# Patient Record
Sex: MALE | Race: WHITE | Employment: UNEMPLOYED | URBAN - METROPOLITAN AREA
[De-identification: names, ages, dates, MRNs, and addresses within clinical notes are randomized per-mention and may not be internally consistent; named-entity substitution may affect disease eponyms.]

---

## 2017-05-03 DIAGNOSIS — E78.2 MIXED HYPERLIPIDEMIA: ICD-10-CM

## 2017-05-03 LAB
A/G RATIO: 1.8 (ref 1.1–2.2)
ALBUMIN SERPL-MCNC: 4.3 G/DL (ref 3.4–5)
ALP BLD-CCNC: 58 U/L (ref 40–129)
ALT SERPL-CCNC: 28 U/L (ref 10–40)
ANION GAP SERPL CALCULATED.3IONS-SCNC: 12 MMOL/L (ref 3–16)
AST SERPL-CCNC: 24 U/L (ref 15–37)
BILIRUB SERPL-MCNC: 0.5 MG/DL (ref 0–1)
BUN BLDV-MCNC: 20 MG/DL (ref 7–20)
CALCIUM SERPL-MCNC: 9.3 MG/DL (ref 8.3–10.6)
CHLORIDE BLD-SCNC: 101 MMOL/L (ref 99–110)
CHOLESTEROL, TOTAL: 149 MG/DL (ref 0–199)
CO2: 28 MMOL/L (ref 21–32)
CREAT SERPL-MCNC: 0.9 MG/DL (ref 0.9–1.3)
GFR AFRICAN AMERICAN: >60
GFR NON-AFRICAN AMERICAN: >60
GLOBULIN: 2.4 G/DL
GLUCOSE BLD-MCNC: 125 MG/DL (ref 70–99)
HDLC SERPL-MCNC: 65 MG/DL (ref 40–60)
LDL CHOLESTEROL CALCULATED: 76 MG/DL
POTASSIUM SERPL-SCNC: 4.9 MMOL/L (ref 3.5–5.1)
SODIUM BLD-SCNC: 141 MMOL/L (ref 136–145)
TOTAL PROTEIN: 6.7 G/DL (ref 6.4–8.2)
TRIGL SERPL-MCNC: 40 MG/DL (ref 0–150)
VLDLC SERPL CALC-MCNC: 8 MG/DL

## 2017-05-04 LAB
ESTIMATED AVERAGE GLUCOSE: 171.4 MG/DL
HBA1C MFR BLD: 7.6 %

## 2017-05-08 ENCOUNTER — OFFICE VISIT (OUTPATIENT)
Dept: ENDOCRINOLOGY | Age: 52
End: 2017-05-08

## 2017-05-08 VITALS
WEIGHT: 195 LBS | HEART RATE: 70 BPM | HEIGHT: 71 IN | DIASTOLIC BLOOD PRESSURE: 68 MMHG | OXYGEN SATURATION: 97 % | BODY MASS INDEX: 27.3 KG/M2 | SYSTOLIC BLOOD PRESSURE: 112 MMHG

## 2017-05-08 DIAGNOSIS — R53.82 CHRONIC FATIGUE: ICD-10-CM

## 2017-05-08 DIAGNOSIS — B08.1 MOLLUSCA CONTAGIOSA: ICD-10-CM

## 2017-05-08 DIAGNOSIS — E78.2 MIXED HYPERLIPIDEMIA: ICD-10-CM

## 2017-05-08 DIAGNOSIS — Z46.81 INSULIN PUMP TITRATION: ICD-10-CM

## 2017-05-08 DIAGNOSIS — E55.9 VITAMIN D DEFICIENCY: ICD-10-CM

## 2017-05-08 LAB
CREATININE URINE: 122.3 MG/DL (ref 39–259)
MICROALBUMIN UR-MCNC: <1.2 MG/DL
MICROALBUMIN/CREAT UR-RTO: NORMAL MG/G (ref 0–30)

## 2017-05-08 PROCEDURE — 99215 OFFICE O/P EST HI 40 MIN: CPT | Performed by: INTERNAL MEDICINE

## 2017-05-08 ASSESSMENT — PATIENT HEALTH QUESTIONNAIRE - PHQ9
1. LITTLE INTEREST OR PLEASURE IN DOING THINGS: 0
2. FEELING DOWN, DEPRESSED OR HOPELESS: 0
SUM OF ALL RESPONSES TO PHQ QUESTIONS 1-9: 0
SUM OF ALL RESPONSES TO PHQ9 QUESTIONS 1 & 2: 0

## 2017-05-25 LAB
ANTI-THYROGLOB ABS: 18 IU/ML
BASOPHILS ABSOLUTE: 0 K/UL (ref 0–0.2)
BASOPHILS RELATIVE PERCENT: 0.5 %
EOSINOPHILS ABSOLUTE: 0.1 K/UL (ref 0–0.6)
EOSINOPHILS RELATIVE PERCENT: 2.6 %
FOLATE: 13.62 NG/ML (ref 4.78–24.2)
HCT VFR BLD CALC: 47 % (ref 40.5–52.5)
HEMOGLOBIN: 15.5 G/DL (ref 13.5–17.5)
HOMOCYSTEINE: 11 UMOL/L (ref 0–10)
LYMPHOCYTES ABSOLUTE: 1.8 K/UL (ref 1–5.1)
LYMPHOCYTES RELATIVE PERCENT: 31.4 %
MCH RBC QN AUTO: 29.3 PG (ref 26–34)
MCHC RBC AUTO-ENTMCNC: 33 G/DL (ref 31–36)
MCV RBC AUTO: 88.7 FL (ref 80–100)
MONOCYTES ABSOLUTE: 0.5 K/UL (ref 0–1.3)
MONOCYTES RELATIVE PERCENT: 8.5 %
NEUTROPHILS ABSOLUTE: 3.2 K/UL (ref 1.7–7.7)
NEUTROPHILS RELATIVE PERCENT: 57 %
PDW BLD-RTO: 14.1 % (ref 12.4–15.4)
PLATELET # BLD: 247 K/UL (ref 135–450)
PMV BLD AUTO: 8.1 FL (ref 5–10.5)
RBC # BLD: 5.3 M/UL (ref 4.2–5.9)
T4 FREE: 1.2 NG/DL (ref 0.9–1.8)
THYROID PEROXIDASE (TPO) ABS: 78 IU/ML
TSH SERPL DL<=0.05 MIU/L-ACNC: 1.58 UIU/ML (ref 0.27–4.2)
VITAMIN B-12: 457 PG/ML (ref 211–911)
WBC # BLD: 5.7 K/UL (ref 4–11)

## 2017-06-29 ENCOUNTER — TELEPHONE (OUTPATIENT)
Dept: ENDOCRINOLOGY | Age: 52
End: 2017-06-29

## 2019-04-28 ENCOUNTER — HOSPITAL ENCOUNTER (INPATIENT)
Age: 54
LOS: 1 days | Discharge: HOME | DRG: 919 | End: 2019-04-30
Attending: EMERGENCY MEDICINE | Admitting: FAMILY MEDICINE

## 2019-04-28 DIAGNOSIS — E10.10 DIABETIC KETOACIDOSIS WITHOUT COMA ASSOCIATED WITH TYPE 1 DIABETES MELLITUS (HCC): Primary | ICD-10-CM

## 2019-04-28 LAB
ANION GAP SERPL CALCULATED.3IONS-SCNC: 29 MMOL/L (ref 3–16)
BASE EXCESS VENOUS: -13 (ref -3–3)
BASE EXCESS VENOUS: -15 (ref -3–3)
BASOPHILS ABSOLUTE: 0.1 K/UL (ref 0–0.2)
BASOPHILS RELATIVE PERCENT: 0.3 %
BILIRUBIN URINE: NEGATIVE
BLOOD, URINE: NEGATIVE
BUN BLDV-MCNC: 39 MG/DL (ref 7–20)
CALCIUM IONIZED: 1.16 MMOL/L (ref 1.12–1.32)
CALCIUM SERPL-MCNC: 9.7 MG/DL (ref 8.3–10.6)
CHLORIDE BLD-SCNC: 90 MMOL/L (ref 99–110)
CLARITY: CLEAR
CO2: 12 MMOL/L (ref 21–32)
CO2: 14 MMOL/L (ref 21–32)
COLOR: YELLOW
CREAT SERPL-MCNC: 1.4 MG/DL (ref 0.9–1.3)
EOSINOPHILS ABSOLUTE: 0 K/UL (ref 0–0.6)
EOSINOPHILS RELATIVE PERCENT: 0 %
GFR AFRICAN AMERICAN: >60
GFR AFRICAN AMERICAN: >60
GFR NON-AFRICAN AMERICAN: 53
GFR NON-AFRICAN AMERICAN: 53
GLUCOSE BLD-MCNC: 394 MG/DL (ref 70–99)
GLUCOSE BLD-MCNC: 524 MG/DL (ref 70–99)
GLUCOSE BLD-MCNC: 569 MG/DL (ref 70–99)
GLUCOSE URINE: >=1000 MG/DL
HCO3 VENOUS: 12.5 MMOL/L (ref 23–29)
HCO3 VENOUS: 13.3 MMOL/L (ref 23–29)
HCT VFR BLD CALC: 46.1 % (ref 40.5–52.5)
HEMOGLOBIN: 15 G/DL (ref 13.5–17.5)
KETONES, URINE: >=80 MG/DL
LACTATE: 2.34 MMOL/L (ref 0.4–2)
LACTATE: 5.27 MMOL/L (ref 0.4–2)
LEUKOCYTE ESTERASE, URINE: NEGATIVE
LYMPHOCYTES ABSOLUTE: 1.1 K/UL (ref 1–5.1)
LYMPHOCYTES RELATIVE PERCENT: 4.8 %
MCH RBC QN AUTO: 29.2 PG (ref 26–34)
MCHC RBC AUTO-ENTMCNC: 32.5 G/DL (ref 31–36)
MCV RBC AUTO: 90 FL (ref 80–100)
MICROSCOPIC EXAMINATION: ABNORMAL
MONOCYTES ABSOLUTE: 1.9 K/UL (ref 0–1.3)
MONOCYTES RELATIVE PERCENT: 7.8 %
NEUTROPHILS ABSOLUTE: 21 K/UL (ref 1.7–7.7)
NEUTROPHILS RELATIVE PERCENT: 87.1 %
NITRITE, URINE: NEGATIVE
O2 SAT, VEN: 50 %
O2 SAT, VEN: 87 %
PCO2, VEN: 27.3 MM HG (ref 40–50)
PCO2, VEN: 31.5 MM HG (ref 40–50)
PDW BLD-RTO: 14 % (ref 12.4–15.4)
PERFORMED ON: ABNORMAL
PH UA: 5.5 (ref 5–8)
PH VENOUS: 7.21 (ref 7.35–7.45)
PH VENOUS: 7.29 (ref 7.35–7.45)
PLATELET # BLD: 278 K/UL (ref 135–450)
PMV BLD AUTO: 8.5 FL (ref 5–10.5)
PO2, VEN: 32 MM HG
PO2, VEN: 57 MM HG
POC ANION GAP: 17 (ref 10–20)
POC BUN: 38 MG/DL (ref 7–18)
POC CHLORIDE: 100 MMOL/L (ref 99–110)
POC CREATININE: 1.4 MG/DL (ref 0.9–1.3)
POC POTASSIUM: 5.4 MMOL/L (ref 3.5–5.1)
POC SAMPLE TYPE: ABNORMAL
POC SODIUM: 131 MMOL/L (ref 136–145)
POTASSIUM REFLEX MAGNESIUM: 5.6 MMOL/L (ref 3.5–5.1)
PROTEIN UA: NEGATIVE MG/DL
RBC # BLD: 5.12 M/UL (ref 4.2–5.9)
SODIUM BLD-SCNC: 131 MMOL/L (ref 136–145)
SPECIFIC GRAVITY UA: 1.02 (ref 1–1.03)
TCO2 CALC VENOUS: 13 MMOL/L
TCO2 CALC VENOUS: 14 MMOL/L
URINE REFLEX TO CULTURE: ABNORMAL
URINE TYPE: ABNORMAL
UROBILINOGEN, URINE: 0.2 E.U./DL
WBC # BLD: 24.1 K/UL (ref 4–11)

## 2019-04-28 PROCEDURE — 83605 ASSAY OF LACTIC ACID: CPT

## 2019-04-28 PROCEDURE — 96361 HYDRATE IV INFUSION ADD-ON: CPT

## 2019-04-28 PROCEDURE — 2580000003 HC RX 258: Performed by: EMERGENCY MEDICINE

## 2019-04-28 PROCEDURE — 96375 TX/PRO/DX INJ NEW DRUG ADDON: CPT

## 2019-04-28 PROCEDURE — 82803 BLOOD GASES ANY COMBINATION: CPT

## 2019-04-28 PROCEDURE — 6370000000 HC RX 637 (ALT 250 FOR IP): Performed by: EMERGENCY MEDICINE

## 2019-04-28 PROCEDURE — 85025 COMPLETE CBC W/AUTO DIFF WBC: CPT

## 2019-04-28 PROCEDURE — 81003 URINALYSIS AUTO W/O SCOPE: CPT

## 2019-04-28 PROCEDURE — 80047 BASIC METABLC PNL IONIZED CA: CPT

## 2019-04-28 PROCEDURE — 6360000002 HC RX W HCPCS: Performed by: EMERGENCY MEDICINE

## 2019-04-28 PROCEDURE — 99291 CRITICAL CARE FIRST HOUR: CPT

## 2019-04-28 PROCEDURE — 96374 THER/PROPH/DIAG INJ IV PUSH: CPT

## 2019-04-28 RX ORDER — 0.9 % SODIUM CHLORIDE 0.9 %
1000 INTRAVENOUS SOLUTION INTRAVENOUS ONCE
Status: COMPLETED | OUTPATIENT
Start: 2019-04-28 | End: 2019-04-28

## 2019-04-28 RX ORDER — SODIUM CHLORIDE 9 MG/ML
INJECTION, SOLUTION INTRAVENOUS
Status: DISCONTINUED
Start: 2019-04-28 | End: 2019-04-29

## 2019-04-28 RX ORDER — ONDANSETRON 2 MG/ML
4 INJECTION INTRAMUSCULAR; INTRAVENOUS ONCE
Status: COMPLETED | OUTPATIENT
Start: 2019-04-28 | End: 2019-04-28

## 2019-04-28 RX ORDER — NICOTINE POLACRILEX 4 MG
15 LOZENGE BUCCAL PRN
Status: DISCONTINUED | OUTPATIENT
Start: 2019-04-28 | End: 2019-04-29

## 2019-04-28 RX ORDER — SODIUM CHLORIDE, SODIUM LACTATE, POTASSIUM CHLORIDE, CALCIUM CHLORIDE 600; 310; 30; 20 MG/100ML; MG/100ML; MG/100ML; MG/100ML
1000 INJECTION, SOLUTION INTRAVENOUS ONCE
Status: COMPLETED | OUTPATIENT
Start: 2019-04-29 | End: 2019-04-29

## 2019-04-28 RX ORDER — DEXTROSE MONOHYDRATE 50 MG/ML
100 INJECTION, SOLUTION INTRAVENOUS PRN
Status: DISCONTINUED | OUTPATIENT
Start: 2019-04-28 | End: 2019-04-29

## 2019-04-28 RX ORDER — ROSUVASTATIN CALCIUM 20 MG/1
TABLET, COATED ORAL
Status: ON HOLD | COMMUNITY
End: 2019-04-30 | Stop reason: HOSPADM

## 2019-04-28 RX ORDER — DEXTROSE MONOHYDRATE 25 G/50ML
12.5 INJECTION, SOLUTION INTRAVENOUS PRN
Status: DISCONTINUED | OUTPATIENT
Start: 2019-04-28 | End: 2019-04-29

## 2019-04-28 RX ADMIN — SODIUM CHLORIDE 0.5 UNITS/HR: 9 INJECTION, SOLUTION INTRAVENOUS at 23:53

## 2019-04-28 RX ADMIN — SODIUM CHLORIDE, POTASSIUM CHLORIDE, SODIUM LACTATE AND CALCIUM CHLORIDE 1000 ML: 600; 310; 30; 20 INJECTION, SOLUTION INTRAVENOUS at 23:30

## 2019-04-28 RX ADMIN — INSULIN HUMAN 10 UNITS: 100 INJECTION, SOLUTION PARENTERAL at 23:17

## 2019-04-28 RX ADMIN — ONDANSETRON 4 MG: 2 INJECTION INTRAMUSCULAR; INTRAVENOUS at 21:41

## 2019-04-28 RX ADMIN — SODIUM CHLORIDE 1000 ML: 9 INJECTION, SOLUTION INTRAVENOUS at 21:41

## 2019-04-29 ENCOUNTER — APPOINTMENT (OUTPATIENT)
Dept: GENERAL RADIOLOGY | Age: 54
DRG: 919 | End: 2019-04-29

## 2019-04-29 PROBLEM — E10.10 DKA, TYPE 1, NOT AT GOAL (HCC): Status: ACTIVE | Noted: 2019-04-29

## 2019-04-29 LAB
ALBUMIN SERPL-MCNC: 3.4 G/DL (ref 3.4–5)
ALBUMIN SERPL-MCNC: 3.5 G/DL (ref 3.4–5)
ALP BLD-CCNC: 51 U/L (ref 40–129)
ALT SERPL-CCNC: 19 U/L (ref 10–40)
ANION GAP SERPL CALCULATED.3IONS-SCNC: 10 MMOL/L (ref 3–16)
ANION GAP SERPL CALCULATED.3IONS-SCNC: 14 MMOL/L (ref 3–16)
ANION GAP SERPL CALCULATED.3IONS-SCNC: 15 MMOL/L (ref 3–16)
ANION GAP SERPL CALCULATED.3IONS-SCNC: 15 MMOL/L (ref 3–16)
ANION GAP SERPL CALCULATED.3IONS-SCNC: 9 MMOL/L (ref 3–16)
ANION GAP SERPL CALCULATED.3IONS-SCNC: 9 MMOL/L (ref 3–16)
AST SERPL-CCNC: 16 U/L (ref 15–37)
BASOPHILS ABSOLUTE: 0 K/UL (ref 0–0.2)
BASOPHILS RELATIVE PERCENT: 0 %
BILIRUB SERPL-MCNC: 0.5 MG/DL (ref 0–1)
BILIRUBIN DIRECT: <0.2 MG/DL (ref 0–0.3)
BILIRUBIN, INDIRECT: ABNORMAL MG/DL (ref 0–1)
BUN BLDV-MCNC: 30 MG/DL (ref 7–20)
BUN BLDV-MCNC: 30 MG/DL (ref 7–20)
BUN BLDV-MCNC: 31 MG/DL (ref 7–20)
BUN BLDV-MCNC: 33 MG/DL (ref 7–20)
BUN BLDV-MCNC: 35 MG/DL (ref 7–20)
BUN BLDV-MCNC: 38 MG/DL (ref 7–20)
CALCIUM SERPL-MCNC: 8 MG/DL (ref 8.3–10.6)
CALCIUM SERPL-MCNC: 8.5 MG/DL (ref 8.3–10.6)
CALCIUM SERPL-MCNC: 8.6 MG/DL (ref 8.3–10.6)
CALCIUM SERPL-MCNC: 8.7 MG/DL (ref 8.3–10.6)
CHLORIDE BLD-SCNC: 101 MMOL/L (ref 99–110)
CHLORIDE BLD-SCNC: 102 MMOL/L (ref 99–110)
CHLORIDE BLD-SCNC: 103 MMOL/L (ref 99–110)
CHLORIDE BLD-SCNC: 103 MMOL/L (ref 99–110)
CHLORIDE BLD-SCNC: 106 MMOL/L (ref 99–110)
CHLORIDE BLD-SCNC: 108 MMOL/L (ref 99–110)
CHOLESTEROL, FASTING: 115 MG/DL (ref 0–199)
CO2: 16 MMOL/L (ref 21–32)
CO2: 16 MMOL/L (ref 21–32)
CO2: 18 MMOL/L (ref 21–32)
CO2: 19 MMOL/L (ref 21–32)
CO2: 20 MMOL/L (ref 21–32)
CO2: 20 MMOL/L (ref 21–32)
CREAT SERPL-MCNC: 1 MG/DL (ref 0.9–1.3)
CREAT SERPL-MCNC: 1.1 MG/DL (ref 0.9–1.3)
CREAT SERPL-MCNC: 1.1 MG/DL (ref 0.9–1.3)
CREAT SERPL-MCNC: 1.2 MG/DL (ref 0.9–1.3)
EOSINOPHILS ABSOLUTE: 0 K/UL (ref 0–0.6)
EOSINOPHILS RELATIVE PERCENT: 0 %
ESTIMATED AVERAGE GLUCOSE: 180 MG/DL
GFR AFRICAN AMERICAN: >60
GFR NON-AFRICAN AMERICAN: >60
GLUCOSE BLD-MCNC: 136 MG/DL (ref 70–99)
GLUCOSE BLD-MCNC: 143 MG/DL (ref 70–99)
GLUCOSE BLD-MCNC: 145 MG/DL (ref 70–99)
GLUCOSE BLD-MCNC: 148 MG/DL (ref 70–99)
GLUCOSE BLD-MCNC: 176 MG/DL (ref 70–99)
GLUCOSE BLD-MCNC: 204 MG/DL (ref 70–99)
GLUCOSE BLD-MCNC: 213 MG/DL (ref 70–99)
GLUCOSE BLD-MCNC: 226 MG/DL (ref 70–99)
GLUCOSE BLD-MCNC: 265 MG/DL (ref 70–99)
GLUCOSE BLD-MCNC: 273 MG/DL (ref 70–99)
GLUCOSE BLD-MCNC: 286 MG/DL (ref 70–99)
GLUCOSE BLD-MCNC: 301 MG/DL (ref 70–99)
GLUCOSE BLD-MCNC: 307 MG/DL (ref 70–99)
GLUCOSE BLD-MCNC: 322 MG/DL (ref 70–99)
GLUCOSE BLD-MCNC: 342 MG/DL (ref 70–99)
GLUCOSE BLD-MCNC: 346 MG/DL (ref 70–99)
GLUCOSE BLD-MCNC: 351 MG/DL (ref 70–99)
GLUCOSE BLD-MCNC: 362 MG/DL (ref 70–99)
GLUCOSE BLD-MCNC: 367 MG/DL (ref 70–99)
GLUCOSE BLD-MCNC: 381 MG/DL (ref 70–99)
GLUCOSE BLD-MCNC: 406 MG/DL (ref 70–99)
GLUCOSE BLD-MCNC: 426 MG/DL (ref 70–99)
GLUCOSE BLD-MCNC: 429 MG/DL (ref 70–99)
GLUCOSE BLD-MCNC: 450 MG/DL (ref 70–99)
HBA1C MFR BLD: 7.9 %
HCT VFR BLD CALC: 39 % (ref 40.5–52.5)
HDLC SERPL-MCNC: 52 MG/DL (ref 40–60)
HEMOGLOBIN: 13.1 G/DL (ref 13.5–17.5)
LDL CHOLESTEROL CALCULATED: 55 MG/DL
LIPASE: 6 U/L (ref 13–60)
LYMPHOCYTES ABSOLUTE: 2.4 K/UL (ref 1–5.1)
LYMPHOCYTES RELATIVE PERCENT: 12 %
MAGNESIUM: 2.2 MG/DL (ref 1.8–2.4)
MAGNESIUM: 2.3 MG/DL (ref 1.8–2.4)
MAGNESIUM: 2.3 MG/DL (ref 1.8–2.4)
MAGNESIUM: 2.4 MG/DL (ref 1.8–2.4)
MCH RBC QN AUTO: 29.6 PG (ref 26–34)
MCHC RBC AUTO-ENTMCNC: 33.6 G/DL (ref 31–36)
MCV RBC AUTO: 88 FL (ref 80–100)
MONOCYTES ABSOLUTE: 0.6 K/UL (ref 0–1.3)
MONOCYTES RELATIVE PERCENT: 3 %
NEUTROPHILS ABSOLUTE: 17.2 K/UL (ref 1.7–7.7)
NEUTROPHILS RELATIVE PERCENT: 85 %
PDW BLD-RTO: 13.9 % (ref 12.4–15.4)
PERFORMED ON: ABNORMAL
PHOSPHORUS: 1 MG/DL (ref 2.5–4.9)
PHOSPHORUS: 1.8 MG/DL (ref 2.5–4.9)
PHOSPHORUS: 1.9 MG/DL (ref 2.5–4.9)
PHOSPHORUS: 2.4 MG/DL (ref 2.5–4.9)
PHOSPHORUS: 2.7 MG/DL (ref 2.5–4.9)
PLATELET # BLD: 201 K/UL (ref 135–450)
PLATELET SLIDE REVIEW: ADEQUATE
PMV BLD AUTO: 7.9 FL (ref 5–10.5)
POTASSIUM SERPL-SCNC: 4.1 MMOL/L (ref 3.5–5.1)
POTASSIUM SERPL-SCNC: 4.5 MMOL/L (ref 3.5–5.1)
POTASSIUM SERPL-SCNC: 4.6 MMOL/L (ref 3.5–5.1)
POTASSIUM SERPL-SCNC: 4.6 MMOL/L (ref 3.5–5.1)
POTASSIUM SERPL-SCNC: 4.9 MMOL/L (ref 3.5–5.1)
POTASSIUM SERPL-SCNC: 5 MMOL/L (ref 3.5–5.1)
RBC # BLD: 4.43 M/UL (ref 4.2–5.9)
SLIDE REVIEW: ABNORMAL
SODIUM BLD-SCNC: 132 MMOL/L (ref 136–145)
SODIUM BLD-SCNC: 133 MMOL/L (ref 136–145)
SODIUM BLD-SCNC: 133 MMOL/L (ref 136–145)
SODIUM BLD-SCNC: 134 MMOL/L (ref 136–145)
SODIUM BLD-SCNC: 135 MMOL/L (ref 136–145)
SODIUM BLD-SCNC: 137 MMOL/L (ref 136–145)
T3 FREE: 1.7 PG/ML (ref 2.3–4.2)
TOTAL PROTEIN: 5.9 G/DL (ref 6.4–8.2)
TRIGLYCERIDE, FASTING: 42 MG/DL (ref 0–150)
TSH SERPL DL<=0.05 MIU/L-ACNC: 1.63 UIU/ML (ref 0.27–4.2)
VLDLC SERPL CALC-MCNC: 8 MG/DL
WBC # BLD: 20.2 K/UL (ref 4–11)

## 2019-04-29 PROCEDURE — 80076 HEPATIC FUNCTION PANEL: CPT

## 2019-04-29 PROCEDURE — 80069 RENAL FUNCTION PANEL: CPT

## 2019-04-29 PROCEDURE — 6370000000 HC RX 637 (ALT 250 FOR IP): Performed by: STUDENT IN AN ORGANIZED HEALTH CARE EDUCATION/TRAINING PROGRAM

## 2019-04-29 PROCEDURE — 36415 COLL VENOUS BLD VENIPUNCTURE: CPT

## 2019-04-29 PROCEDURE — 82043 UR ALBUMIN QUANTITATIVE: CPT

## 2019-04-29 PROCEDURE — 82570 ASSAY OF URINE CREATININE: CPT

## 2019-04-29 PROCEDURE — 85025 COMPLETE CBC W/AUTO DIFF WBC: CPT

## 2019-04-29 PROCEDURE — 2500000003 HC RX 250 WO HCPCS: Performed by: STUDENT IN AN ORGANIZED HEALTH CARE EDUCATION/TRAINING PROGRAM

## 2019-04-29 PROCEDURE — 84443 ASSAY THYROID STIM HORMONE: CPT

## 2019-04-29 PROCEDURE — 83735 ASSAY OF MAGNESIUM: CPT

## 2019-04-29 PROCEDURE — 80061 LIPID PANEL: CPT

## 2019-04-29 PROCEDURE — 6360000002 HC RX W HCPCS: Performed by: STUDENT IN AN ORGANIZED HEALTH CARE EDUCATION/TRAINING PROGRAM

## 2019-04-29 PROCEDURE — 83690 ASSAY OF LIPASE: CPT

## 2019-04-29 PROCEDURE — 2000000000 HC ICU R&B

## 2019-04-29 PROCEDURE — 2580000003 HC RX 258: Performed by: STUDENT IN AN ORGANIZED HEALTH CARE EDUCATION/TRAINING PROGRAM

## 2019-04-29 PROCEDURE — 99222 1ST HOSP IP/OBS MODERATE 55: CPT | Performed by: INTERNAL MEDICINE

## 2019-04-29 PROCEDURE — 83036 HEMOGLOBIN GLYCOSYLATED A1C: CPT

## 2019-04-29 PROCEDURE — 87040 BLOOD CULTURE FOR BACTERIA: CPT

## 2019-04-29 PROCEDURE — 71045 X-RAY EXAM CHEST 1 VIEW: CPT

## 2019-04-29 PROCEDURE — 94760 N-INVAS EAR/PLS OXIMETRY 1: CPT

## 2019-04-29 PROCEDURE — 84481 FREE ASSAY (FT-3): CPT

## 2019-04-29 PROCEDURE — 84100 ASSAY OF PHOSPHORUS: CPT

## 2019-04-29 RX ORDER — HEPARIN SODIUM 5000 [USP'U]/ML
5000 INJECTION, SOLUTION INTRAVENOUS; SUBCUTANEOUS EVERY 8 HOURS SCHEDULED
Status: DISCONTINUED | OUTPATIENT
Start: 2019-04-29 | End: 2019-04-30 | Stop reason: HOSPADM

## 2019-04-29 RX ORDER — DEXTROSE MONOHYDRATE 50 MG/ML
100 INJECTION, SOLUTION INTRAVENOUS PRN
Status: DISCONTINUED | OUTPATIENT
Start: 2019-04-29 | End: 2019-04-30 | Stop reason: HOSPADM

## 2019-04-29 RX ORDER — DEXTROSE, SODIUM CHLORIDE, AND POTASSIUM CHLORIDE 5; .45; .15 G/100ML; G/100ML; G/100ML
INJECTION INTRAVENOUS CONTINUOUS
Status: DISCONTINUED | OUTPATIENT
Start: 2019-04-29 | End: 2019-04-29

## 2019-04-29 RX ORDER — SODIUM CHLORIDE 450 MG/100ML
INJECTION, SOLUTION INTRAVENOUS
Status: DISPENSED
Start: 2019-04-29 | End: 2019-04-29

## 2019-04-29 RX ORDER — SODIUM CHLORIDE 450 MG/100ML
INJECTION, SOLUTION INTRAVENOUS CONTINUOUS
Status: DISCONTINUED | OUTPATIENT
Start: 2019-04-29 | End: 2019-04-29

## 2019-04-29 RX ORDER — DEXTROSE AND SODIUM CHLORIDE 5; .45 G/100ML; G/100ML
INJECTION, SOLUTION INTRAVENOUS CONTINUOUS PRN
Status: DISCONTINUED | OUTPATIENT
Start: 2019-04-29 | End: 2019-04-29

## 2019-04-29 RX ORDER — NICOTINE POLACRILEX 4 MG
15 LOZENGE BUCCAL PRN
Status: DISCONTINUED | OUTPATIENT
Start: 2019-04-29 | End: 2019-04-30 | Stop reason: HOSPADM

## 2019-04-29 RX ORDER — SODIUM CHLORIDE 9 MG/ML
INJECTION, SOLUTION INTRAVENOUS
Status: DISPENSED
Start: 2019-04-29 | End: 2019-04-29

## 2019-04-29 RX ORDER — DEXTROSE MONOHYDRATE 25 G/50ML
12.5 INJECTION, SOLUTION INTRAVENOUS PRN
Status: DISCONTINUED | OUTPATIENT
Start: 2019-04-29 | End: 2019-04-30 | Stop reason: HOSPADM

## 2019-04-29 RX ORDER — DEXTROSE, SODIUM CHLORIDE, AND POTASSIUM CHLORIDE 5; .45; .15 G/100ML; G/100ML; G/100ML
INJECTION INTRAVENOUS CONTINUOUS PRN
Status: DISCONTINUED | OUTPATIENT
Start: 2019-04-29 | End: 2019-04-29

## 2019-04-29 RX ORDER — POTASSIUM CHLORIDE 7.45 MG/ML
10 INJECTION INTRAVENOUS PRN
Status: DISCONTINUED | OUTPATIENT
Start: 2019-04-29 | End: 2019-04-30 | Stop reason: HOSPADM

## 2019-04-29 RX ORDER — ROSUVASTATIN CALCIUM 20 MG/1
20 TABLET, COATED ORAL NIGHTLY
Status: DISCONTINUED | OUTPATIENT
Start: 2019-04-30 | End: 2019-04-30 | Stop reason: HOSPADM

## 2019-04-29 RX ADMIN — DEXTROSE AND SODIUM CHLORIDE: 5; 450 INJECTION, SOLUTION INTRAVENOUS at 06:33

## 2019-04-29 RX ADMIN — SODIUM CHLORIDE 0.01 UNITS/KG/HR: 900 INJECTION, SOLUTION INTRAVENOUS at 01:49

## 2019-04-29 RX ADMIN — HEPARIN SODIUM 5000 UNITS: 5000 INJECTION, SOLUTION INTRAVENOUS; SUBCUTANEOUS at 22:09

## 2019-04-29 RX ADMIN — POTASSIUM PHOSPHATE, MONOBASIC AND POTASSIUM PHOSPHATE, DIBASIC 21 MMOL: 224; 236 INJECTION, SOLUTION, CONCENTRATE INTRAVENOUS at 20:49

## 2019-04-29 RX ADMIN — HEPARIN SODIUM 5000 UNITS: 5000 INJECTION, SOLUTION INTRAVENOUS; SUBCUTANEOUS at 06:04

## 2019-04-29 RX ADMIN — INSULIN HUMAN 10 UNITS: 100 INJECTION, SOLUTION PARENTERAL at 15:34

## 2019-04-29 RX ADMIN — INSULIN GLARGINE 30 UNITS: 100 INJECTION, SOLUTION SUBCUTANEOUS at 11:06

## 2019-04-29 RX ADMIN — SODIUM CHLORIDE: 4.5 INJECTION, SOLUTION INTRAVENOUS at 01:51

## 2019-04-29 RX ADMIN — INSULIN LISPRO 2 UNITS: 100 INJECTION, SOLUTION INTRAVENOUS; SUBCUTANEOUS at 12:14

## 2019-04-29 RX ADMIN — POTASSIUM CHLORIDE, DEXTROSE MONOHYDRATE AND SODIUM CHLORIDE: 150; 5; 450 INJECTION, SOLUTION INTRAVENOUS at 10:06

## 2019-04-29 RX ADMIN — INSULIN HUMAN 10 UNITS: 100 INJECTION, SOLUTION PARENTERAL at 20:46

## 2019-04-29 RX ADMIN — SODIUM CHLORIDE 20.1 UNITS/HR: 900 INJECTION, SOLUTION INTRAVENOUS at 08:37

## 2019-04-29 RX ADMIN — INSULIN GLARGINE 20 UNITS: 100 INJECTION, SOLUTION SUBCUTANEOUS at 23:25

## 2019-04-29 RX ADMIN — INSULIN HUMAN 15 UNITS: 100 INJECTION, SOLUTION PARENTERAL at 21:28

## 2019-04-29 RX ADMIN — SODIUM CHLORIDE 1.52 UNITS/HR: 900 INJECTION, SOLUTION INTRAVENOUS at 11:09

## 2019-04-29 RX ADMIN — INSULIN LISPRO 10 UNITS: 100 INJECTION, SOLUTION INTRAVENOUS; SUBCUTANEOUS at 12:14

## 2019-04-29 RX ADMIN — INSULIN LISPRO 6 UNITS: 100 INJECTION, SOLUTION INTRAVENOUS; SUBCUTANEOUS at 17:33

## 2019-04-29 RX ADMIN — HEPARIN SODIUM 5000 UNITS: 5000 INJECTION, SOLUTION INTRAVENOUS; SUBCUTANEOUS at 14:47

## 2019-04-29 RX ADMIN — POTASSIUM PHOSPHATE, MONOBASIC AND POTASSIUM PHOSPHATE, DIBASIC 20 MMOL: 224; 236 INJECTION, SOLUTION, CONCENTRATE INTRAVENOUS at 06:03

## 2019-04-29 RX ADMIN — SODIUM CHLORIDE 2.55 UNITS/HR: 900 INJECTION, SOLUTION INTRAVENOUS at 09:54

## 2019-04-29 RX ADMIN — INSULIN HUMAN 15 UNITS: 100 INJECTION, SOLUTION PARENTERAL at 23:24

## 2019-04-29 RX ADMIN — INSULIN LISPRO 10 UNITS: 100 INJECTION, SOLUTION INTRAVENOUS; SUBCUTANEOUS at 17:33

## 2019-04-29 ASSESSMENT — PAIN SCALES - GENERAL
PAINLEVEL_OUTOF10: 0
PAINLEVEL_OUTOF10: 0

## 2019-04-29 ASSESSMENT — ENCOUNTER SYMPTOMS
COLOR CHANGE: 0
ABDOMINAL PAIN: 1
NAUSEA: 1
COUGH: 0
CONSTIPATION: 0
ABDOMINAL PAIN: 0
DIARRHEA: 0
SHORTNESS OF BREATH: 0
VOMITING: 1

## 2019-04-29 NOTE — ED PROVIDER NOTES
Date ofevaluation: 4/28/2019    Chief Complaint   Hyperglycemia    Nursing Notes, Past Medical Hx, Past Surgical Hx, Social Hx, Allergies, and Family Hx were reviewed. History of Present Illness     Mac Hughes is a 47 y.o. male who presents with concerns of DKA. He is a type 1 diabetic managed on an insulin pump normally. He has been feeling fatigued, nauseated/vomiting, and had increased UOP and glucose readings over the past 2 days. He is concerned he is in DKA. When he changed his insulin pump needle today he noticed the plastic catheter was bent. He last changed it 3 days ago. He denies fever, abdominal pain, SOB, chest pain, diarrhea, rash, extremity pain/skin redness. He feels groggy but mental status is baseline per wife and patient. Review of Systems     Review of Systems   Constitutional: Positive for fatigue. Negative for activity change, appetite change and fever. HENT: Negative for congestion. Respiratory: Negative for cough and shortness of breath. Cardiovascular: Negative for chest pain. Gastrointestinal: Positive for nausea and vomiting. Negative for abdominal pain. Endocrine: Positive for polydipsia and polyuria. Genitourinary: Negative for decreased urine volume. Skin: Negative for color change and wound. Neurological: Negative for headaches. All other systems reviewed and are negative. Past Medical, Surgical, Family, and Social History         Diagnosis Date    Blood glucose labile     Hyperlipidemia     Insulin pump status     Wears V Go insulin delivery system. Removed at [phone] on12/6.  It is external      Thyroid nodule     NODULE    Type I (juvenile type) diabetes mellitus without mention of complication, uncontrolled          Procedure Laterality Date    MOUTH SURGERY      OTHER SURGICAL HISTORY Right [id number]    EXCISION OF RIGHT SHOULDER MASS        His family history includes Diabetes in his maternal grandmother and mother; Heart Disease in his father; High Blood Pressure in his mother. He reports that he has never smoked. He has never used smokeless tobacco. He reports that he does not drink alcohol or use drugs. Medications     Previous Medications    GLUCAGON 1 MG INJECTION    Inject 1 mg into the skin as needed. GLUCOSE BLOOD VI TEST STRIPS (SHAYY CONTOUR NEXT TEST) STRIP    TEST BLOOD SUGAR SIX TIMES DAILY    HUMALOG 100 UNIT/ML INJECTION VIAL    INJECT UP  UNITS UNDER THE SKIN DAILY VIA INSULIN PUMP    INSULIN LISPRO (HUMALOG) 100 UNIT/ML INJECTION VIAL    Humalog U-100 Insulin 100 unit/mL subcutaneous solution    ROSUVASTATIN (CRESTOR) 20 MG TABLET    Take 1 tablet by mouth nightly    ROSUVASTATIN (CRESTOR) 20 MG TABLET    rosuvastatin 20 mg tablet       Allergies     He has No Known Allergies. Physical Exam     INITIAL VITALS: BP (!) 138/56   Pulse 111   Resp 17   SpO2 99%    Physical Exam   Constitutional: He is oriented to person, place, and time. He appears well-developed and well-nourished. No distress. Smells of ketones   HENT:   Head: Normocephalic and atraumatic. Mouth/Throat: No oropharyngeal exudate. Dry MM    Eyes: Pupils are equal, round, and reactive to light. EOM are normal.   Neck: Normal range of motion. Neck supple. Cardiovascular: Regular rhythm and normal heart sounds. Mild tachycardia   Pulmonary/Chest: Effort normal and breath sounds normal. No stridor. No respiratory distress. He has no wheezes. He has no rales. He exhibits no tenderness. Abdominal: Soft. He exhibits no distension. There is no tenderness. There is no guarding. Musculoskeletal: Normal range of motion. He exhibits no edema or deformity. Neurological: He is alert and oriented to person, place, and time. No cranial nerve deficit. Coordination normal.   Skin: Skin is warm and dry. Capillary refill takes less than 2 seconds. No rash noted. Psychiatric: He has a normal mood and affect.    Nursing note and vitals reviewed. Diagnostic Results       RADIOLOGY:  No orders to display       LABS:   Labs Reviewed   CBC WITH AUTO DIFFERENTIAL - Abnormal; Notable for the following components:       Result Value    WBC 24.1 (*)     Neutrophils # 21.0 (*)     Monocytes # 1.9 (*)     All other components within normal limits    Narrative:     Performed at: [facility] - [facility]  [address] [address] [address]   Phone [phone]   BASIC METABOLIC PANEL W/ REFLEX TO MG FOR LOW K - Abnormal; Notable for the following components:    Sodium 131 (*)     Potassium reflex Magnesium 5.6 (*)     Chloride 90 (*)     CO2 12 (*)     Anion Gap 29 (*)     Glucose 569 (*)     BUN 39 (*)     CREATININE 1.4 (*)     GFR Non- 53 (*)     All other components within normal limits    Narrative:     Danny Brett tel. [id number],  Chemistry results called to and read back by [facility], 04/28/2019 22:10,  by ENEDINA  Performed at: [facility] [employer] [facility] [place] [address] [address]   Phone [phone]   POCT VENOUS - Abnormal; Notable for the following components:    POC Sodium 131 (*)     POC Potassium 5.4 (*)     CO2 14 (*)     POC Glucose 524 (*)     POC BUN 38 (*)     POC Creatinine 1.4 (*)     GFR Non- 53 (*)     All other components within normal limits    Narrative:     Performed at: [facility] - [facility]  [address] [address] [address]   Phone [phone]   POCT VENOUS - Abnormal; Notable for the following components:    pH, Jeet 7.207 (*)     pCO2, Jeet 31.5 (*)     HCO3, Venous 12.5 (*)     Base Excess, Jeet -15 (*)     Lactate 5.27 (*)     All other components within normal limits    Narrative:     Performed at:   [facility] - [facility]  [address] [address]   Phone [phone]   URINE RT REFLEX TO CULTURE   POCT BLOOD GASES   POCT CHEM BASIC W ICA   POCT LACTIC ACID (LACTATE)   POCT GLUCOSE       RECENTVITALS:  BP: (!) 138/56,  , Pulse: 111, Resp: 17     Procedures       ED Course     The patient was given the following medications:  Orders Placed This Encounter   Medications    0.9 % sodium chloride bolus    ondansetron (ZOFRAN) injection 4 mg    sodium chloride 0.9 % infusion     REJI LOWE: cabinet override    insulin regular (HUMULIN R;NOVOLIN R) 100 Units in sodium chloride 0.9 % 100 mL infusion    glucose (GLUTOSE) 40 % oral gel 15 g    dextrose 50 % solution 12.5 g    glucagon (rDNA) injection 1 mg    dextrose 5 % solution    insulin regular (HUMULIN R;NOVOLIN R) injection 10 Units            CONSULTS:  IP CONSULT TO HOSPITALIST    MEDICAL DECISION MAKING     Tariq Lobo is a 47 y.o. male presenting with concerns for DKA. Upon presentation, he was slightly tachycardic but otherwise had reassuring vital signs. Laboratory assessment was notable for ketosis and metabolic acidosis with a venous pH of 7.20, bicarb of 12 and anion gap of 29. Glucose is 569 and he has ketones in his urine. I have some concerns that his insulin pump may be malfunctioning given that it did not alarm with kinked tubing. He was given an insulin bolus and insulin drip was initiated once potassium of 5.6 was confirmed. Although he has a leukocytosis of 24, he does not have any localizing infection on history or physical exam.  I feel he requires admission. He has been admitted to the intensive care unit. I have discussed his care with the hospitalist and the ICU team.    This patient was also evaluated by the attending physician. All care plans were discussed and agreedupon. Clinical Impression     1. Diabetic ketoacidosis without coma associated with type 1 diabetes mellitus ([address]        Disposition/Plan     PATIENT REFERRED TO:  No follow-up provider specified.     DISCHARGE MEDICATIONS:  New Prescriptions    No medications on file       DISPOSITION Decision To Admit 04/28/2019 11:05:29 PM        Sunil Prabhakar MD  Resident  04/29/19 2732

## 2019-04-29 NOTE — PROGRESS NOTES
[facility] [employer]  Diabetes Education   Progress Note       [facility] RECORD NUMBER:  [id number]  AGE: 47 y.o. GENDER: male  : 1965  TODAY'S DATE:  2019    Subjective   Reason for Diabetic Education Evaluation and Assessment: General diabetes education    Visit Type: evaluation      Irish Peñaloza is a 47 y.o. male referred by:     [x] Physician  [] Nursing  [] Chart Review   [] Other:     PAST MEDICAL HISTORY        Diagnosis Date    Blood glucose labile     Hyperlipidemia     Insulin pump status     Wears V Go insulin delivery system. Removed at [phone] on.  It is external      Thyroid nodule     NODULE    Type I (juvenile type) diabetes mellitus without mention of complication, uncontrolled        PAST SURGICAL HISTORY    Past Surgical History:   Procedure Laterality Date    MOUTH SURGERY      OTHER SURGICAL HISTORY Right [id number]    EXCISION OF RIGHT SHOULDER MASS          FAMILY HISTORY    Family History   Problem Relation Age of Onset    High Blood Pressure Mother     Diabetes Mother     Heart Disease Father     Diabetes Maternal Grandmother        SOCIAL HISTORY    Social History     Tobacco Use    Smoking status: Never Smoker    Smokeless tobacco: Never Used   Substance Use Topics    Alcohol use: No    Drug use: No       ALLERGIES    No Known Allergies    MEDICATIONS     [START ON 2019] rosuvastatin  20 mg Oral Nightly    heparin (porcine)  5,000 Units Subcutaneous 3 times per day    insulin lispro  10 Units Subcutaneous TID WC    insulin lispro  0-12 Units Subcutaneous TID WC    insulin lispro  0-6 Units Subcutaneous Nightly       Objective        Patient Active Problem List   Diagnosis Code    Thyroid nodule E04.1    Mass of shoulder region R22.30    SIRS (systemic inflammatory response syndrome) ([facility]) R65.10    FUO (fever of unknown origin) R50.9    CAP (community acquired pneumonia) J18.9    Fever R50.9    DKA (diabetic ketoacidoses) ([facility]) E13.10    Mixed hyperlipidemia E78.2    Uncontrolled type 1 diabetes mellitus without complication (HCC) V05.20    Insulin pump titration Z46.81    DKA, type 1, not at goal ([facility] E10.10        /68   Pulse 91   Temp 98.2 °F (36.8 °C) (Oral)   Resp 20   Ht 5' 11\" (1.803 m)   Wt 195 lb 8.8 oz (88.7 kg)   SpO2 97%   BMI 27.27 kg/m²     HgBA1c:    Lab Results   Component Value Date    LABA1C 7.9 04/29/2019       Recent Labs     04/29/19  0948 04/29/19  1102 04/29/19  1157 04/29/19  1313   POCGLU 145* 136* 148* 265*       BUN/Creatinine:    Lab Results   Component Value Date    BUN 31 04/29/2019    CREATININE 1.0 04/29/2019       Assessment        Diabetes Management and Education    Does the patient have a Primary Care Physician? Yes,      Does the patient require new medication instruction? To be determined    Person responsible for administration of Insulin/Medication:        [x] Self     [] Caregiver       [] Spouse       [] Other Family Member   []  Other    Insulin Instruction:  insulin pump  Injection Site:   [x] location    [x] rotation     Level of patient/caregiver understanding able to:     [] Verbalized Understanding   [] Demonstrated Understanding       [] Teach Back       [x] Needs Reinforcement     []  Other:        Does the patient/caregiver monitor Blood Glucoses? Yes    Does the patient/caregiver follow a Meal Plan? Reviewed importance of eating three meals per day for consistent carb intake. Level of patient/caregiver understanding able to:     [] Verbalized Understanding   [] Demonstrated Understanding       [] Teach Back       [x] Needs Reinforcement     []  Other:      Does the patient/caregiver understand S/S of Hypoglycemia? Reviewed symptoms, prevention and treatment.      Level of patient/caregiver understanding able to:    \ [] Verbalized Understanding   [] Demonstrated Understanding       [] Teach Back       [x] Needs Reinforcement     []  Other:

## 2019-04-29 NOTE — PROGRESS NOTES
mL/hr at 19 1374    sodium chloride      sodium chloride       PRN Meds:dextrose, potassium chloride, dextrose 5 % and 0.45 % NaCl    Objective:   Vitals  T-max: Temp (24hrs), Av.7 °F (37.1 °C), Min:98.6 °F (37 °C), Max:98.7 °F (37.1 °C)    Patient Vitals for the past 8 hrs:   BP Temp Temp src Pulse Resp SpO2 Height Weight   19 0600 113/65 -- -- 98 19 97 % -- --   19 0500 (!) 112/59 -- -- 98 18 97 % -- --   19 0400 (!) 111/49 98.7 °F (37.1 °C) Oral 95 17 96 % -- --   19 0300 (!) 108/52 -- -- 97 19 98 % -- --   19 0251 (!) 108/50 -- -- 96 20 99 % -- --   19 0100 104/61 98.6 °F (37 °C) Oral 100 18 100 % 5' 11\" (1.803 m) 195 lb 8.8 oz (88.7 kg)   19 0030 114/69 -- -- -- -- 100 % -- --       Intake/Output Summary (Last 24 hours) at 2019 0751  Last data filed at 2019 0747  Gross per 24 hour   Intake 1340 ml   Output 650 ml   Net 690 ml       Physical Exam   Constitutional: He is oriented to person, place, and time. He appears well-developed and well-nourished. No distress. HENT:   Head: Normocephalic and atraumatic. Eyes: Pupils are equal, round, and reactive to light. EOM are normal.   Neck: Normal range of motion. No JVD present. Cardiovascular: Normal rate, regular rhythm, normal heart sounds and intact distal pulses. Exam reveals no gallop and no friction rub. No murmur heard. Pulmonary/Chest: Effort normal and breath sounds normal. No stridor. No respiratory distress. He has no wheezes. He has no rales. He exhibits no tenderness. Abdominal: Soft. Bowel sounds are normal. He exhibits no distension and no mass. There is no tenderness. There is no rebound and no guarding. Musculoskeletal: Normal range of motion. He exhibits no edema or deformity. Lymphadenopathy:     He has no cervical adenopathy. Neurological: He is alert and oriented to person, place, and time. He displays normal reflexes. No cranial nerve deficit or sensory deficit.  He exhibits normal muscle tone. Coordination normal.   Skin: Skin is warm and dry. Capillary refill takes less than 2 seconds. He is not diaphoretic. LABS    CBC:   Recent Labs     04/28/19 2143 04/29/19  0356   WBC 24.1* 20.2*   HGB 15.0 13.1*   HCT 46.1 39.0*    201   MCV 90.0 88.0     Renal:   Recent Labs     04/28/19 2122 04/28/19 2143 04/29/19  0356   NA  --  131* 133*   K  --  5.6* 4.6   CL  --  90* 102   CO2 14* 12* 16*   BUN  --  39* 38*   CREATININE 1.4* 1.4* 1.1   GLUCOSE  --  569* 362*   CALCIUM  --  9.7 8.6   MG  --   --  2.30   PHOS  --   --  1.9*   ANIONGAP  --  29* 15     Hepatic: No results for input(s): AST, ALT, BILITOT, BILIDIR, PROT, LABALBU, ALKPHOS in the last 72 hours. Troponin: No results for input(s): TROPONINI in the last 72 hours. BNP: No results for input(s): BNP in the last 72 hours. Lipids: No results for input(s): CHOL, HDL in the last 72 hours. Invalid input(s): LDLCALCU, TRIGLYCERIDE  ABGs:  No results for input(s): PHART, ARG6LKU, PO2ART, VUW0NHL, BEART, THGBART, L0XMFBFX, PRC9ECW in the last 72 hours. INR: No results for input(s): INR in the last 72 hours. Lactate:   Recent Labs     04/28/19 2123 04/28/19  2352   LACTATE 5.27* 2.34*     -----------------------------------------------------------------  Imaging:  XR CHEST PORTABLE   Final Result      No active cardiopulmonary disease. Assessment/Plan:   Josue Campos is a 47 y.o. male with a Hx of DMI (s/p insulin pump 2015, 90-95 U daily) p/w one day's duration of fatigue, abd pain, nausea and vomiting and admitted for diabetic ketoacidosis. DKA - Possible insulin pump malfunction - kinked line when changed. No recent illnesses or endorsing symptoms. Found to be acidotic to 7.207/31.5/32/12. 5. HCO: 12. UA showed ketones.  Corrected Na on admission: 139.   - Endocrinology consulted for home insulin pump management   - insulin gtt   - BMP Q4H   - glucose q1h -- Glucose 362 -> 226 - > 204  - 1/2 NS at 250 ml/hr -- switched to D5 1/2 NS this AM   - NPO  - consulted diabetes educator, social work     Anion Gap Metabolic Acidosis 2/2 DKA -- HCO3: 16 (from 12), AG: 15 (from 29) this AM.   - improving with insulin gtt    - BMP Q4H     JUSTIN - Likely 2/2 to dehydration and compounding DKA, Cr: 1.4 on admission, Cr: 1.1 today, baseline 0.8   - BMP Q4H     HLD  - cont Crestor tomorrow night     Code Status:  Full   FEN: NPO   PPX: Heparin 5000U SQ   DISPO: ICU, insulin gtt,       -----------------------------  JEANNETTE Dan acting as scribe   4/29/2019  7:51 AM

## 2019-04-29 NOTE — ED PROVIDER NOTES
ED Attending Attestation Note     Date of evaluation: 4/28/2019    This patient was seen by the resident. I have seen and examined the patient, agree with the workup, evaluation, management and diagnosis. The care plan has been discussed. My assessment reveals an uncomfortable but nontoxic-appearing 81-year-old male who is an insulin-dependent diabetic, on insulin pump, presents with DKA. He states he's not had DKA since he's been on insulin pump. He does report decreased by mouth intake and strenuous activity yesterday, which he believes is the inciting factor. Insulin pump is otherwise working however he did note that when he replaced the pump earlier today, the needle was kinked but the pump did not indicate a malfunction. Patient received IV fluids. Acidotic with pH 7.2. Critical Care:  Due to the immediate potential for life-threatening deterioration due to DKA, I spent 40 minutes providing critical care. This time excludes time spent performing procedures but includes time spent on direct patient care, history retrieval, review of the chart, and discussions with patient, family, and consultant(s).       Yvette Olmedo MD  04/28/19 7968       Yvette Olmedo MD  04/28/19 4870

## 2019-04-29 NOTE — PROGRESS NOTES
Musculoskeletal: Normal range of motion. He exhibits no edema or tenderness. Neurological: He is alert and oriented to person, place, and time. Grossly non-focal   Skin: Skin is warm and dry. He is not diaphoretic. Psychiatric: He has a normal mood and affect. His behavior is normal. Judgment and thought content normal.   Nursing note and vitals reviewed. Labs:  Reviewed  Recent Labs     04/28/19 2143 04/29/19  0356   WBC 24.1* 20.2*   HGB 15.0 13.1*   HCT 46.1 39.0*    201     Recent Labs     04/28/19 2143 04/29/19  0356 04/29/19  0803   * 133* 135*   K 5.6* 4.6 4.1   CL 90* 102 106   CO2 12* 16* 19*   BUN 39* 38* 35*   CREATININE 1.4* 1.1 1.1   CALCIUM 9.7 8.6 8.6   PHOS  --  1.9* 2.4*     No results for input(s): AST, ALT, BILIDIR, BILITOT, ALKPHOS in the last 72 hours. No results for input(s): INR in the last 72 hours. No results for input(s): Eward Pong in the last 72 hours. Urinalysis:   Lab Results   Component Value Date    NITRU Negative 04/28/2019    WBCUA None seen 07/09/2014    RBCUA 0-2 07/09/2014    BLOODU Negative 04/28/2019    SPECGRAV 1.020 04/28/2019    GLUCOSEU >=1000 04/28/2019       Radiology:  Reviewed  XR CHEST PORTABLE   Final Result      No active cardiopulmonary disease. Assessment & Plan  Patient Active Hospital Problem List:   DKA, type 1, not at goal [facility]) (4/29/2019)    Assessment: likely 2/2 dehydration &/or JUSTIN &/or gastritis. He changed site on pump Sunday evening per usual schedule. He noted a kink in the needle; however, pump never alarmed as it would for kink in tubing. Plan: Insulin drip transition to subq. Hold home insulin pump (2015, 90-95u daily) due to ?malfunction. Resolved Problems: JUSTIN    DVT Prophylaxis: SQH  Diet: DIET CARB CONTROL;  Code Status: Full Code  PT/OT Eval Status: not needed  Dispo - ICU, floor vs home when stable.     Will discuss with MD Belem Robles MD

## 2019-04-29 NOTE — PROGRESS NOTES
Pt admitted to ICU bed 4525 with wife at bedside. Pt calm and pleasant. Able to walk from stretcher to bed. Insulin gtt and IVF infusing. Pt given CHG bath and sacral heart applied. No skin breakdown noticed. Insulin pump disconnected. 4 Eyes Skin Assessment     The patient is being assess for  Admission    I agree that 2 RN's have performed a thorough Head to Toe Skin Assessment on the patient. ALL assessment sites listed below have been assessed. Areas assessed by both nurses: Courtney Esteves and Walterlin  [x]   Head, Face, and Ears   [x]   Shoulders, Back, and Chest  [x]   Arms, Elbows, and Hands   [x]   Coccyx, Sacrum, and IschIum -sacral heart  [x]   Legs, Feet, and Heels        Does the Patient have Skin Breakdown?   No         [place] Prevention initiated:  No   Wound Care Orders initiated:  No      [facility] nurse consulted for Pressure Injury (Stage 3,4, Unstageable, DTI, NWPT, and Complex wounds), New and Established Ostomies:  No      Nurse 1 eSignature: Electronically signed by Yamil Toscano RN on 4/29/19 at 4:30 AM    **SHARE this note so that the co-signing nurse is able to place an eSignature**    Nurse 2 eSignature: Electronically signed by Mary Marquis RN on 4/29/19 at 5:14 AM

## 2019-04-29 NOTE — PROGRESS NOTES
chills, diaphoresis, activity change, appetite change, fatigue, night sweats and unexpected weight change. EYES:  negative for blurred vision, eye discharge, visual disturbance and icterus  HEENT:  negative for hearing loss, tinnitus, ear drainage, sinus pressure, nasal congestion, epistaxis and snoring  RESPIRATORY:  Denies productive or dry cough, dyspnea at rest or with exertion, no hemoptysis. CARDIOVASCULAR:  negative for chest pain, palpitations, exertional chest pressure/discomfort, edema, syncope  GASTROINTESTINAL:  negative for nausea, vomiting, diarrhea, constipation, blood in stool and abdominal pain  GENITOURINARY:  negative for frequency, dysuria, urinary incontinence, decreased urine volume, and hematuria  HEMATOLOGIC/LYMPHATIC:  negative for easy bruising, bleeding and lymphadenopathy  ALLERGIC/IMMUNOLOGIC:  negative for recurrent infections, angioedema, anaphylaxis and drug reactions  ENDOCRINE:  negative for weight changes and diabetic symptoms including polyuria, polydipsia and polyphagia  MUSCULOSKELETAL:  negative for  pain, joint swelling, decreased range of motion and muscle weakness  NEUROLOGICAL:  negative for headaches, slurred speech, unilateral weakness  PSYCHIATRIC/BEHAVIORAL: negative for hallucinations, behavioral problems, confusion and agitation.      Scheduled Medications:    [START ON 4/30/2019] rosuvastatin  20 mg Oral Nightly    heparin (porcine)  5,000 Units Subcutaneous 3 times per day    insulin glargine  30 Units Subcutaneous Once    insulin lispro  10 Units Subcutaneous TID WC    insulin lispro  0-12 Units Subcutaneous TID WC    insulin lispro  0-6 Units Subcutaneous Nightly      PRN Medications: dextrose, potassium chloride, glucose, dextrose, glucagon (rDNA), dextrose    Continuous Infusions:   sodium chloride      sodium chloride      dextrose 5% and 0.45% NaCl with KCl 20 mEq 150 mL/hr at 04/29/19 1006    insulin (HUMAN R) non-weight based infusion      dextrose         PHYSICAL EXAM:  /65   Pulse 98   Temp 98.7 °F (37.1 °C) (Oral)   Resp 19   Ht 5' 11\" (1.803 m)   Wt 195 lb 8.8 oz (88.7 kg)   SpO2 97%   BMI 27.27 kg/m²   Recent Labs     04/29/19  0507 04/29/19  0607 04/29/19  0755 04/29/19  0844 04/29/19  0948   POCGLU 273* 226* 204* 176* 145*       Intake/Output Summary (Last 24 hours) at 4/29/2019 1026  Last data filed at 4/29/2019 0747  Gross per 24 hour   Intake 1340 ml   Output 650 ml   Net 690 ml       Physical Exam   Constitutional: He is oriented to person, place, and time. HENT:   Head: Normocephalic and atraumatic. Eyes: Pupils are equal, round, and reactive to light. EOM are normal.   Neck: Normal range of motion. Neck supple. Cardiovascular: Normal rate, regular rhythm, normal heart sounds and intact distal pulses. Pulmonary/Chest: Effort normal and breath sounds normal. No stridor. No respiratory distress. He has no wheezes. He has no rales. Abdominal: Soft. Bowel sounds are normal. He exhibits no distension. There is no tenderness. There is no guarding. Musculoskeletal: Normal range of motion. He exhibits no edema or tenderness. Neurological: He is alert and oriented to person, place, and time. He displays normal reflexes. No cranial nerve deficit. He exhibits normal muscle tone. Coordination normal.   Skin: Skin is warm and dry.          LABS:  Recent Labs     04/28/19 2143 04/29/19  0356   WBC 24.1* 20.2*   HGB 15.0 13.1*   HCT 46.1 39.0*    201                                                                    Recent Labs     04/28/19 2143 04/29/19  0356 04/29/19  0803   * 133* 135*   K 5.6* 4.6 4.1   CL 90* 102 106   CO2 12* 16* 19*   BUN 39* 38* 35*   CREATININE 1.4* 1.1 1.1   GLUCOSE 569* 362* 213*       Assessment & Plan:    Santosh Morton is a 47 y.o. male with a Hx of DMI (s/p insulin pump 2015, 90-95 U daily) p/w one day's duration of fatigue, abd pain, nausea and vomiting and admitted for diabetic ketoacidosis.      DKA - Possible insulin pump malfunction - kinked line when changed. No recent illnesses or endorsing symptoms. Found to be acidotic to 7.207/31.5/32/12. 5. HCO: 12. UA showed ketones. Corrected Na on admission: 139.   - Endocrinology consulted for home insulin pump management   - insulin gtt, overlap with SQ basal insulin.   - BMP Q4H   - glucose q1h -- Glucose 362 -> 226 - > 204  - 1/2 NS at 250 ml/hr -- switched to D5 1/2 NS this AM   - consulted diabetes educator, social work   - AG closed x 2; will transition insulin gtt to subq basal insulin with overlap 1-2 hours. - Lantus 30 units ordered, starting diet. - Lispro 10 u TID WC, medium SSI. - Will follow up Endocrinology recs regarding insulin pump. Anion Gap Metabolic Acidosis 2/2 DKA -- HCO3: 16 (from 12), AG: 15 (from 29) this AM.   - improving with insulin gtt and fluids   - BMP Q4H      JUSTIN - Likely 2/2 to dehydration and compounding DKA, Cr: 1.4 on admission, Cr: 1.1 today, baseline 0.8   - BMP Q4H      HLD  - cont Crestor     FEN: DIET CARB CONTROL;  VTE Prophylaxis: SQ heparin  Code Status: Full Code  Disposition: ICU; transfer to General Medicine Floor vs possible discharge if glucose stable on insulin pump. Aj Park MS4 acting as scribe     I will discuss the patient with attending physician. Duane Parisian, M.D. Internal Medicine Resident, PGY-2  Contact via [facility]  4/29/2019, 10:26 AM    Patient seen, examined and discussed with the resident and I agree with the assessment and plan as edited above. Briefly, this is a 47 y.o. male with IDDM type 1 on an insulin pump admitted to ICU for high anion gap metabolic acidosis 2/2 DKA. Etiology of his decompensation sounds like a pump malfunction. Patient has been without an episode of DKA for the 4 years since he got his pump and his sugars were controlled until Saturday evening. 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   He noted his sugars were in the 270s before bed that night so he gave himself a bolus through the pump and found he had to get up to urinate much more than normal through the night and by the next morning he felt awful and his wife said he smelled of ketones. They tried additional boluses through the pump but to no avail, and he came in with an anion gap of 29 and glucose of 569. He changed his pump site before coming to the hospital and noted that the plastic subcutaneous pump catheter was kinked. He was appropriately placed on IVF and an insulin infusion and as of this morning he is feeling much better and his glucose is controlled and his anion gap is closed. We are going to recheck to endocrinology to see if they feel any adjustments are needed to the pump, I also recommended to the patient that if such an issue should happen again for his sugar is not coming down despite boluses given through the pump that he should try needle injection into the subcu space and an earlier change of his catheter site. Depending on the recommendations from endocrinology patient may be appropriate to go home if we can control his blood sugar.       Emily Hopson MD

## 2019-04-29 NOTE — H&P
Internal Medicine PGY-1 Resident History & Physical      PCP: Ciara Conner MD    Date of Admission: 4/28/2019    Date of Service: Pt seen/examined on 4/29/2019 and Admitted to Inpatient with expected LOS greaterthan two midnights due to medical therapy. Chief Complaint:  Fatigue, weakness, hyperglycemia       History Of Present Illness:      47 y.o. male [facility] with PMH of T1IDDM (s/p insulin pump 4 years ago, 90-95 U daily) presented w/ one day hx of fatigue, abd pain, n/v. States that last night went to bed and could not sleep, kept tossing and turning around, this morning woke up feeling fatigue, nauseous and dizzy. Checked his sugar and it was 229 this AM. Did not have an appetite all day but was feeling very thirsty and having polyuria. Per wife, his breath smelled sweet all day. Denies any fevers, chills, CP, SOB. No recent illnesses, just got back from 2 week travel to [place] for work. Per wife, patient is stubborn and sometimes does not drink water enough. When patient went to change insulin injection site, noted needle was bent, which has never happened before. In ED, patient was found to be acidotic to 7.207/31.5/32/12.5, dehydrated with Cr 1.4,  on arrival. AG 29, patient was given 2L IVF and started on insulin gtt. UA showed ketones. Admitted for DKA     Past Medical History:          Diagnosis Date    Blood glucose labile     Hyperlipidemia     Insulin pump status     Wears V Go insulin delivery system. Removed at [phone] on12/6. It is external      Thyroid nodule     NODULE    Type I (juvenile type) diabetes mellitus without mention of complication, uncontrolled        Past Surgical History:          Procedure Laterality Date    MOUTH SURGERY      OTHER SURGICAL HISTORY Right [id number]    EXCISION OF RIGHT SHOULDER MASS          Medications Prior to Admission:      Prior to Admission medications    Medication Sig Start Date End Date Taking? Authorizing Provider   insulin lispro (HUMALOG) 100 UNIT/ML injection vial Humalog U-100 Insulin 100 unit/mL subcutaneous solution   Yes Historical Provider, MD   rosuvastatin (CRESTOR) 20 MG tablet Take 1 tablet by mouth nightly 11/11/16  Yes GINO Han CNP   rosuvastatin (CRESTOR) 20 MG tablet rosuvastatin 20 mg tablet    Historical Provider, MD   glucose blood VI test strips (SHAYY CONTOUR NEXT TEST) strip TEST BLOOD SUGAR SIX TIMES DAILY 3/24/17   GINO Han CNP   HUMALOG 100 UNIT/ML injection vial INJECT UP  UNITS UNDER THE SKIN DAILY VIA INSULIN PUMP 12/12/16   GINO Han CNP   glucagon 1 MG injection Inject 1 mg into the skin as needed. 9/25/14   GINO Han CNP       Allergies:  Patient has no known allergies. Social History:      The patient currently lives at home with wife, works in [employer], in between jobs currently. TOBACCO:   reports that he has never smoked. He has never used smokeless tobacco.  ETOH:   reports that he does not drink alcohol. Family History:     Reviewed in detail and negative for DM, CAD, Cancer, CVA. Positive as follows:        Problem Relation Age of Onset    High Blood Pressure Mother     Diabetes Mother     Heart Disease Father     Diabetes Maternal Grandmother        REVIEW OF SYSTEMS: Pertinent positives as noted in the HPI. All other systems reviewed and negative. ROS: Review of Systems   Constitutional: Positive for appetite change and fatigue. Negative for chills and fever. HENT: Negative for congestion. Respiratory: Negative for cough and shortness of breath. Cardiovascular: Negative for chest pain. Gastrointestinal: Positive for abdominal pain, nausea and vomiting. Negative for constipation and diarrhea. Endocrine: Positive for polydipsia and polyuria. Genitourinary: Negative for dysuria. Neurological: Positive for dizziness, tremors, weakness and light-headedness.        PHYSICAL EXAM PERFORMED:    BP (!) 138/56   Pulse 111   Resp 17   SpO2 99%     General appearance:  Well appearing  M, No apparent distress, appears stated age and cooperative. HEENT:  Normal cephalic,atraumatic without obvious deformity. Pupils equal, round, and reactive to light. Extra ocular muscles intact. Conjunctivae/corneas clear. Neck: Supple, with full range of motion. No jugular venous distention. Trachea midline. Respiratory:  Normal respiratory effort. Clear to auscultation, bilaterally without Rales/Wheezes/Rhonchi. Cardiovascular:  tachycardic and regular rhythm with normal S1/S2 without murmurs, rubs or gallops. Abdomen: Soft, mildly TPP in epigastric region, non-distended with normal bowel sounds. Insulin pump present on R hip   Musculoskeletal:  No clubbing, cyanosis oredema bilaterally. Full range of motion without deformity. Skin: Skin color, texture, turgor normal.  Norashes or lesions. Neurologic:  Neurovascularly intact without any focal sensory/motor deficits. Cranialnerves: II-XII intact, grossly non-focal.  Psychiatric:  Alert and oriented, thought content appropriate,normal insight  Capillary Refill: Brisk,< 3 seconds   Peripheral Pulses: +2 palpable, equal bilaterally       Labs:     Recent Labs     04/28/19 2143   WBC 24.1*   HGB 15.0   HCT 46.1        Recent Labs     04/28/19 2122 04/28/19  2143   NA  --  131*   K  --  5.6*   CL  --  90*   CO2 14* 12*   BUN  --  39*   CREATININE 1.4* 1.4*   CALCIUM  --  9.7     Urinalysis:      Lab Results   Component Value Date    NITRU Negative 04/28/2019    WBCUA None seen 07/09/2014    RBCUA 0-2 07/09/2014    BLOODU Negative 04/28/2019    SPECGRAV 1.020 04/28/2019    GLUCOSEU >=1000 04/28/2019       Radiology:     No orders to display       ASSESSMENT & PLAN:  Zofia Brennan is a 47 y.o. male w/ PMH of T1IDDM (s/p insulin pump 90-95 U daily). Admitted for DKA.     Active Hospital Problems    Diagnosis Date Noted    DKA, type 1,

## 2019-04-30 VITALS
SYSTOLIC BLOOD PRESSURE: 122 MMHG | HEART RATE: 72 BPM | BODY MASS INDEX: 27.38 KG/M2 | TEMPERATURE: 98 F | HEIGHT: 71 IN | OXYGEN SATURATION: 98 % | RESPIRATION RATE: 24 BRPM | DIASTOLIC BLOOD PRESSURE: 76 MMHG | WEIGHT: 195.55 LBS

## 2019-04-30 LAB
ANION GAP SERPL CALCULATED.3IONS-SCNC: 10 MMOL/L (ref 3–16)
ANION GAP SERPL CALCULATED.3IONS-SCNC: 11 MMOL/L (ref 3–16)
ANION GAP SERPL CALCULATED.3IONS-SCNC: 12 MMOL/L (ref 3–16)
ANION GAP SERPL CALCULATED.3IONS-SCNC: 14 MMOL/L (ref 3–16)
BASOPHILS ABSOLUTE: 0 K/UL (ref 0–0.2)
BASOPHILS RELATIVE PERCENT: 0.2 %
BUN BLDV-MCNC: 25 MG/DL (ref 7–20)
BUN BLDV-MCNC: 26 MG/DL (ref 7–20)
BUN BLDV-MCNC: 27 MG/DL (ref 7–20)
BUN BLDV-MCNC: 29 MG/DL (ref 7–20)
CALCIUM SERPL-MCNC: 8.5 MG/DL (ref 8.3–10.6)
CALCIUM SERPL-MCNC: 8.5 MG/DL (ref 8.3–10.6)
CALCIUM SERPL-MCNC: 8.8 MG/DL (ref 8.3–10.6)
CALCIUM SERPL-MCNC: 9.1 MG/DL (ref 8.3–10.6)
CHLORIDE BLD-SCNC: 100 MMOL/L (ref 99–110)
CHLORIDE BLD-SCNC: 102 MMOL/L (ref 99–110)
CHLORIDE BLD-SCNC: 103 MMOL/L (ref 99–110)
CHLORIDE BLD-SCNC: 104 MMOL/L (ref 99–110)
CO2: 19 MMOL/L (ref 21–32)
CO2: 21 MMOL/L (ref 21–32)
CO2: 23 MMOL/L (ref 21–32)
CO2: 24 MMOL/L (ref 21–32)
CREAT SERPL-MCNC: 0.8 MG/DL (ref 0.9–1.3)
CREAT SERPL-MCNC: 0.9 MG/DL (ref 0.9–1.3)
CREAT SERPL-MCNC: 0.9 MG/DL (ref 0.9–1.3)
CREAT SERPL-MCNC: 1 MG/DL (ref 0.9–1.3)
CREATININE URINE: 139.7 MG/DL (ref 39–259)
EOSINOPHILS ABSOLUTE: 0.1 K/UL (ref 0–0.6)
EOSINOPHILS RELATIVE PERCENT: 1.1 %
ESTIMATED AVERAGE GLUCOSE: 180 MG/DL
GFR AFRICAN AMERICAN: >60
GFR NON-AFRICAN AMERICAN: >60
GLUCOSE BLD-MCNC: 188 MG/DL (ref 70–99)
GLUCOSE BLD-MCNC: 197 MG/DL (ref 70–99)
GLUCOSE BLD-MCNC: 208 MG/DL (ref 70–99)
GLUCOSE BLD-MCNC: 214 MG/DL (ref 70–99)
GLUCOSE BLD-MCNC: 214 MG/DL (ref 70–99)
GLUCOSE BLD-MCNC: 217 MG/DL (ref 70–99)
GLUCOSE BLD-MCNC: 228 MG/DL (ref 70–99)
GLUCOSE BLD-MCNC: 237 MG/DL (ref 70–99)
GLUCOSE BLD-MCNC: 240 MG/DL (ref 70–99)
GLUCOSE BLD-MCNC: 244 MG/DL (ref 70–99)
GLUCOSE BLD-MCNC: 252 MG/DL (ref 70–99)
GLUCOSE BLD-MCNC: 253 MG/DL (ref 70–99)
GLUCOSE BLD-MCNC: 255 MG/DL (ref 70–99)
GLUCOSE BLD-MCNC: 268 MG/DL (ref 70–99)
GLUCOSE BLD-MCNC: 269 MG/DL (ref 70–99)
GLUCOSE BLD-MCNC: 279 MG/DL (ref 70–99)
GLUCOSE BLD-MCNC: 279 MG/DL (ref 70–99)
GLUCOSE BLD-MCNC: 287 MG/DL (ref 70–99)
GLUCOSE BLD-MCNC: 292 MG/DL (ref 70–99)
GLUCOSE BLD-MCNC: 300 MG/DL (ref 70–99)
GLUCOSE BLD-MCNC: 302 MG/DL (ref 70–99)
HBA1C MFR BLD: 7.9 %
HCT VFR BLD CALC: 38 % (ref 40.5–52.5)
HEMOGLOBIN: 12.9 G/DL (ref 13.5–17.5)
LYMPHOCYTES ABSOLUTE: 1.9 K/UL (ref 1–5.1)
LYMPHOCYTES RELATIVE PERCENT: 19.4 %
MAGNESIUM: 2 MG/DL (ref 1.8–2.4)
MAGNESIUM: 2.1 MG/DL (ref 1.8–2.4)
MAGNESIUM: 2.1 MG/DL (ref 1.8–2.4)
MAGNESIUM: 2.3 MG/DL (ref 1.8–2.4)
MCH RBC QN AUTO: 29.8 PG (ref 26–34)
MCHC RBC AUTO-ENTMCNC: 34 G/DL (ref 31–36)
MCV RBC AUTO: 87.7 FL (ref 80–100)
MICROALBUMIN UR-MCNC: <1.2 MG/DL
MICROALBUMIN/CREAT UR-RTO: NORMAL MG/G (ref 0–30)
MONOCYTES ABSOLUTE: 0.6 K/UL (ref 0–1.3)
MONOCYTES RELATIVE PERCENT: 6.6 %
NEUTROPHILS ABSOLUTE: 7 K/UL (ref 1.7–7.7)
NEUTROPHILS RELATIVE PERCENT: 72.7 %
PDW BLD-RTO: 14 % (ref 12.4–15.4)
PERFORMED ON: ABNORMAL
PHOSPHORUS: 1.8 MG/DL (ref 2.5–4.9)
PHOSPHORUS: 2 MG/DL (ref 2.5–4.9)
PHOSPHORUS: 2.9 MG/DL (ref 2.5–4.9)
PHOSPHORUS: 2.9 MG/DL (ref 2.5–4.9)
PLATELET # BLD: 170 K/UL (ref 135–450)
PMV BLD AUTO: 7.6 FL (ref 5–10.5)
POTASSIUM SERPL-SCNC: 4.4 MMOL/L (ref 3.5–5.1)
POTASSIUM SERPL-SCNC: 4.5 MMOL/L (ref 3.5–5.1)
POTASSIUM SERPL-SCNC: 4.7 MMOL/L (ref 3.5–5.1)
POTASSIUM SERPL-SCNC: 4.7 MMOL/L (ref 3.5–5.1)
RBC # BLD: 4.33 M/UL (ref 4.2–5.9)
SODIUM BLD-SCNC: 135 MMOL/L (ref 136–145)
SODIUM BLD-SCNC: 135 MMOL/L (ref 136–145)
SODIUM BLD-SCNC: 136 MMOL/L (ref 136–145)
SODIUM BLD-SCNC: 137 MMOL/L (ref 136–145)
WBC # BLD: 9.6 K/UL (ref 4–11)

## 2019-04-30 PROCEDURE — 85025 COMPLETE CBC W/AUTO DIFF WBC: CPT

## 2019-04-30 PROCEDURE — 6360000002 HC RX W HCPCS: Performed by: STUDENT IN AN ORGANIZED HEALTH CARE EDUCATION/TRAINING PROGRAM

## 2019-04-30 PROCEDURE — 99232 SBSQ HOSP IP/OBS MODERATE 35: CPT | Performed by: INTERNAL MEDICINE

## 2019-04-30 PROCEDURE — 6370000000 HC RX 637 (ALT 250 FOR IP): Performed by: STUDENT IN AN ORGANIZED HEALTH CARE EDUCATION/TRAINING PROGRAM

## 2019-04-30 PROCEDURE — 83735 ASSAY OF MAGNESIUM: CPT

## 2019-04-30 PROCEDURE — 80048 BASIC METABOLIC PNL TOTAL CA: CPT

## 2019-04-30 PROCEDURE — 94760 N-INVAS EAR/PLS OXIMETRY 1: CPT

## 2019-04-30 PROCEDURE — 84100 ASSAY OF PHOSPHORUS: CPT

## 2019-04-30 RX ADMIN — HEPARIN SODIUM 5000 UNITS: 5000 INJECTION, SOLUTION INTRAVENOUS; SUBCUTANEOUS at 14:08

## 2019-04-30 RX ADMIN — INSULIN HUMAN 5 UNITS: 100 INJECTION, SOLUTION PARENTERAL at 02:31

## 2019-04-30 RX ADMIN — INSULIN LISPRO 10 UNITS: 100 INJECTION, SOLUTION INTRAVENOUS; SUBCUTANEOUS at 07:26

## 2019-04-30 RX ADMIN — HEPARIN SODIUM 5000 UNITS: 5000 INJECTION, SOLUTION INTRAVENOUS; SUBCUTANEOUS at 05:42

## 2019-04-30 ASSESSMENT — PAIN SCALES - GENERAL
PAINLEVEL_OUTOF10: 0

## 2019-04-30 NOTE — DISCHARGE SUMMARY
insulin. Blood glucose elevated with closed AG.      19: Overnight glucose elevated to 450. Anion gap reopened briefly (15, 14, 14). Received 20 Lantus and 45 U regular insulin. Glucose 214 this AM with a A. Received 10 U premeal. Restarted on home insulin pump. Diabetic Ketoacidosis (Resolved)   Possibly 2/2 to insulin pump malfunction. The patient noticed a kinked line when he changed his needle/catheter. He denied any recent illnesses or endorsing symptoms. On admission he was found to be acidotic to 7.207/31.5/32/12.5, HCO: 12 and the UA showed ketones. Corrected Na on admission: 139. Was placed on lasix gtt with IVF. Was transitioned to SQ insulin once glucose <200, HCO3> 15 and AG< 14 x2. He was monitored with BMP Q4H and glucose checks Q1H. Patient was restarted on home insulin pump for glucose management. Was seen by diabetes educator and social work for diabetes management.      Anion Gap Metabolic Acidosis 2/2 DKA (Resolved)   HCO3: 12, A on admission. Numbers improved with insulin and IVF. Monitored with BMP Q4H.       JUSTIN (Resolved)   Likely 2/2 to dehydration and compounding DKA. Cr: 1.4 on admission. Improved with IVF and lasix gtt. Monitored with BMP Q4H.      HLD  Patient was continued on home Crestor.      Physical Exam:  BP (!) 139/99   Pulse 74   Temp 98 °F (36.7 °C) (Oral)   Resp 16   Ht 5' 11\" (1.803 m)   Wt 195 lb 8.8 oz (88.7 kg)   SpO2 97%   BMI 27.27 kg/m²     BP (!) 139/99   Pulse 74   Temp 98 °F (36.7 °C) (Oral)   Resp 16   Ht 5' 11\" (1.803 m)   Wt 195 lb 8.8 oz (88.7 kg)   SpO2 97%   BMI 27.27 kg/m²     General Appearance:    Alert, cooperative, no distress, appears stated age   Head:    Normocephalic, without obvious abnormality, atraumatic   Eyes:    PERRL, conjunctiva/corneas clear, EOM's intact, fundi     benign, both eyes        Ears:    Normal TM's and external ear canals, both ears   Nose:   Nares normal, septum midline, mucosa normal, no drainage or sinus tenderness   Throat:   Lips, mucosa, and tongue normal; teeth and gums normal   Neck:   Supple, symmetrical, trachea midline, no adenopathy;        thyroid:  No enlargement/tenderness/nodules; no carotid    bruit or JVD   Back:     Symmetric, no curvature, ROM normal, no CVA tenderness   Lungs:     Clear to auscultation bilaterally, respirations unlabored   Chest wall:    No tenderness or deformity   Heart:    Regular rate and rhythm, S1 and S2 normal, no murmur, rub   or gallop   Abdomen:     Soft, non-tender, bowel sounds active all four quadrants,     no masses, no organomegaly   Genitalia:    Normal male without lesion, discharge or tenderness   Rectal:    Normal tone, normal prostate, no masses or tenderness;    guaiac negative stool   Extremities:   Extremities normal, atraumatic, no cyanosis or edema   Pulses:   2+ and symmetric all extremities   Skin:   Skin color, texture, turgor normal, no rashes or lesions   Lymph nodes:   Cervical, supraclavicular, and axillary nodes normal   Neurologic:   CNII-XII intact. Normal strength, sensation and reflexes       throughout       Consults: Pulmonary/intensive care, endocrinology, diabetes educator and social work. Significant Diagnostic Studies: Chest x-ray unremarkable   Treatments: IV hydration and insulin: Humalog, regular and Lantus  Disposition: home  Discharged Condition: Stable  Follow Up: Endocrinology 10:30 Thursday. DISCHARGE MEDICATION:     Medication List      ASK your doctor about these medications    blood glucose test strips strip  Commonly known as:  SHAYY CONTOUR NEXT TEST  TEST BLOOD SUGAR SIX TIMES DAILY     glucagon 1 MG injection  Inject 1 mg into the skin as needed.      * HUMALOG 100 UNIT/ML injection vial  Generic drug:  insulin lispro  INJECT UP  UNITS UNDER THE SKIN DAILY VIA INSULIN PUMP     * HUMALOG 100 UNIT/ML injection vial  Generic drug:  insulin lispro     * rosuvastatin 20 MG tablet  Commonly known as: CRESTOR  Take 1 tablet by mouth nightly     * rosuvastatin 20 MG tablet  Commonly known as:  CRESTOR         * This list has 4 medication(s) that are the same as other medications prescribed for you. Read the directions carefully, and ask your doctor or other care provider to review them with you.               Activity: activity as tolerated  Diet: diabetic diet  Wound Care: none needed    Time Spent on discharge is more than 45 minutes    Signed:  Renetta Sullivan MD PGY2  4/30/2019  [phone]

## 2019-04-30 NOTE — PLAN OF CARE
Problem: Fluid Volume - Imbalance:  Goal: Will remain free of signs and symptoms of dehydration  Description  Will remain free of signs and symptoms of dehydration  4/30/2019 0204 by Kortney Rizvi RN  Outcome: Ongoing  Note:   VSS HR 80s sinus rhythm good oral intake & adequate urine output. Problem: Serum Glucose Level - Abnormal:  Goal: Ability to maintain appropriate glucose levels will improve  Description  Ability to maintain appropriate glucose levels will improve  Outcome: Ongoing  Note:   Remains off insulin gtt insulin pump remains off as well by Dr Dena Vazquez order pt received total 40 units regular insulin IV anion gap 15 & 14 recent  will continue to check BS hourly also EP1 mg & phos Q 4hr as ordered. Phosphate 1.6 & 1.0 potassium phos 21 mmol IV started earlier this shift infusing over 6 hours. Problem: Injury - Acid Base Imbalance:  Goal: Acid-base balance  Description  Acid-base balance  Outcome: Ongoing  Note:   RA sating high 90s RR 17 no respiratory distress noted total bicarb 18 & 16 Dr Jazmin Luna is aware. Problem: Venous Thromboembolism:  Goal: Will show no signs or symptoms of venous thromboembolism  Description  Will show no signs or symptoms of venous thromboembolism  Outcome: Ongoing  Note:   No s/s of dvt noted VTE prophylaxis-subcutaneous heparin Q 8hr. Problem: Pain:  Goal: Pain level will decrease  Description  Pain level will decrease  Outcome: Ongoing  Note:   Denies pain resting in bed quietly.

## 2019-04-30 NOTE — PLAN OF CARE
PT Will remain free of falls throughout LOS. Bed lowest position. Bed alarm on. Bed lowest position. 3/4  Side rails up. Pt oriented to room. Call light within reach. Non skid footwear applied. Fall flag and fall risk sign placed. Pt notified of when to call RN. Pt currently has elevated Blood glucose. Pt at risk for impaired skin integrity. Turns self. Sacral Heart in place. GAP remains closed Will continue to monitor and assess.

## 2019-04-30 NOTE — FLOWSHEET NOTE
epoc  anion gap 14 total CO2 16 Dr German Champion is aware pt received 15 unit regular insulin IV & 20 units lantus subcutaneously.

## 2019-04-30 NOTE — PROGRESS NOTES
Internal Medicine Resident  Hospitalist Progress Note      PCP: Shanique Gupta MD    Date of Admission: 4/28/2019    Chief Complaint:   Chief Complaint   Patient presents with    Hyperglycemia         Subjective: feeling \"normal\". No cp, sob, lh/dizziness, abdominal pain, N/V. Medications:  Reviewed  InfusionMedications    dextrose      Insulin Pump - insulin lispro Stopped (04/30/19 2000)     Scheduled Medications    rosuvastatin  20 mg Oral Nightly    heparin (porcine)  5,000 Units Subcutaneous 3 times per day    insulin lispro  10 Units Subcutaneous TID      PRN Meds: dextrose, potassium chloride, glucose, dextrose, glucagon (rDNA), dextrose      Intake/Output Summary (Last 24 hours) at 4/30/2019 0802  Last data filed at 4/30/2019 0600  Gross per 24 hour   Intake 2986.93 ml   Output 1550 ml   Net 1436.93 ml       Physical Exam Performed:  BP (!) 119/91   Pulse 86   Temp 97.5 °F (36.4 °C) (Oral)   Resp 16   Ht 5' 11\" (1.803 m)   Wt 195 lb 8.8 oz (88.7 kg)   SpO2 97%   BMI 27.27 kg/m²   Physical Exam   Constitutional: He is oriented to person, place, and time. He appears well-developed and well-nourished. No distress. HENT:   Head: Normocephalic and atraumatic. Eyes: Pupils are equal, round, and reactive to light. EOM are normal.   Neck: Neck supple. No tracheal deviation present. Cardiovascular: Normal rate, regular rhythm, normal heart sounds and intact distal pulses. Pulmonary/Chest: Effort normal and breath sounds normal. No stridor. No respiratory distress. He has no wheezes. He has no rales. Abdominal: Soft. Bowel sounds are normal. He exhibits no distension. There is no tenderness. There is no guarding. Musculoskeletal: Normal range of motion. He exhibits no edema or tenderness. Neurological: He is alert and oriented to person, place, and time. Grossly non-focal   Skin: Skin is warm and dry. He is not diaphoretic. Psychiatric: He has a normal mood and affect.  His behavior is normal. Judgment and thought content normal.   Nursing note and vitals reviewed. Labs:  Reviewed  Recent Labs     04/28/19  2143 04/29/19  0356 04/30/19  0551   WBC 24.1* 20.2* 9.6   HGB 15.0 13.1* 12.9*   HCT 46.1 39.0* 38.0*    201 170     Recent Labs     04/29/19  2231 04/30/19  0152 04/30/19  0551   * 136 137   K 4.9 4.7 4.7    103 104   CO2 16* 19* 21   BUN 33* 29* 26*   CREATININE 1.2 1.0 0.9   CALCIUM 8.7 8.5 8.5   PHOS 1.0* 2.9 2.9     Recent Labs     04/29/19  1038   AST 16   ALT 19   BILIDIR <0.2   BILITOT 0.5   ALKPHOS 51     No results for input(s): INR in the last 72 hours. No results for input(s): Dareen Serge in the last 72 hours. Urinalysis:   Lab Results   Component Value Date    NITRU Negative 04/28/2019    WBCUA None seen 07/09/2014    RBCUA 0-2 07/09/2014    BLOODU Negative 04/28/2019    SPECGRAV 1.020 04/28/2019    GLUCOSEU >=1000 04/28/2019       Radiology:  Reviewed  XR CHEST PORTABLE   Final Result      No active cardiopulmonary disease. Assessment & Plan  Patient Active Hospital Problem List:   DM Type 1, not at goal [facility]) (4/29/2019)    Assessment: DKA resolved    Plan: started on insulin pump this am after changing malfunctioned parts. Accuchecks to ensure proper functioning of pump. Resolved Problems:     JUSTIN    DKA    DVT Prophylaxis: SQH  Diet: DIET CARB CONTROL; Carb Control: 3 carb choices (45 gms)/meal  Code Status: Full Code  PT/OT Eval Status: not needed  Dispo - floor, home when stable, as early as tonight    Will discuss with Reeda Craver, MD Claris Sever, MD

## 2019-04-30 NOTE — FLOWSHEET NOTE
epo  Dr Dalton Pineda notified will give additional 15 units regular insulin IVP will get F/U renal panel at 2230.

## 2019-04-30 NOTE — PROGRESS NOTES
Met with pt. Pt started on insulin pump earlier. Medical resident spoke with pt's endocrinology NP GINO Duran CNP regarding pt's pump settings and Blood sugars. Pt 's blood sugars are trending in the 200's since restarting his insulin pump. Pt scheduled to see Alexandra Singh CNP on Thursday at 10:30 at the [facility] [place] office for follow up for his diabetes . Pt states that he is moving at the end of the month to [place] and only has enough insulin for about 15 days for his insulin pump. Pt given information regarding Humalog and Novolog Patient Assistance programs to see if he is eligible to receive assistance with his insulin. Pt states that he does not have any other questions or concerns at this time. Pt waiting to see if he will be discharged today.

## 2019-04-30 NOTE — PROGRESS NOTES
Pt Glucose after meal is under goal (under 300 is goal). Discharging per Dr. Torres Rivera instructions. All IVs removed.  Pt belonging all gathered and sent with PT.

## 2019-04-30 NOTE — PLAN OF CARE
Explained Discharge instructions with PT. Pt feels prepared and ready to effectively manage insulin pump. Pt remained free of falls and free of skin breakdown throughout LOS.

## 2019-04-30 NOTE — DISCHARGE SUMMARY
INTERNAL MEDICINE DEPARTMENT AT [address] [place] [address]  DISCHARGE SUMMARY    Patient ID: Dima Miu                                             Discharge Date: 4/30/2019   Patient's PCP: Rob Salinas MD                                          Discharge Physician: Rosy Hargrove MD  Admit Date: 4/28/2019   Admitting Physician: Mayte Pinedo MD    PROBLEMS DURING HOSPITALIZATION:  Present on Admission:   DKA, type 1, not at goal [facility])  Type 1 Insulin-dependent diabetes mellitus, on insulin pump. Anion gap metabolic acidosis 2/2 DKA  Diabetic ketoacidosis  Insulin pump catheter malfunction  Acute kidney injury, likely prerenal    DISCHARGE DIAGNOSES:  Type 1 Insulin-dependent diabetes mellitus, on insulin pump. Anion gap metabolic acidosis 2/2 DKA, resolved  Diabetic ketoacidosis, resolved  Insulin pump catheter malfunction, resolved  Acute kidney injury, likely prerenal, resolved    HPI & Hospital Course:  47 y. o. male who presented to [facility] with PMH of T1IDDM (s/p insulin pump 4 years ago, 90-95 U daily) presented w/ one day hx of fatigue, abd pain, n/v. States that last night went to bed and could not sleep, kept tossing and turning around, this morning woke up feeling fatigue, nauseous and dizzy. Checked his sugar and it was 229 this AM. Did not have an appetite all day but was feeling very thirsty and having polyuria. Per wife, his breath smelled sweet all day. Denies any fevers, chills, CP, SOB. No recent illnesses, just got back from 2 week travel to [place] for work. Per wife, patient is stubborn and sometimes does not drink water enough. When patient went to change insulin injection site, noted needle was bent, which has never happened before. In the ED, patient was found to be acidotic to 7.207/31.5/32/12.5, dehydrated with Cr 1.4,  on arrival. AG 29, patient was given 2L IVF and started on insulin gtt. UA showed ketones.  Admitted for DKA   04/28/19: Found to be acidotic to 7.207/31.5/32/12.5, dehydrated with Cr 1.4,  on arrival. AG 29, patient was given 2L IVF and started on insulin gtt. UA showed ketones. Corrected Na on admission: 139.   19: Gap closed overnight. Insulin below 250. Transitioned to SQ insulin. Blood glucose elevated with closed AG.   19: Overnight glucose elevated to 450. Anion gap reopened briefly (15, 14, 14). Received 20 Lantus and 45 U regular insulin. Glucose 214 this AM with a A. Received 10 U premeal. Restarted on home insulin pump.        The following issues were addressed during hospitalization:  Diabetic Ketoacidosis, POA, resolved  Possibly 2/2 to insulin pump malfunction. The patient noticed a kinked line when he changed his needle/catheter. He denied any recent illnesses or endorsing symptoms. On admission he was found to be acidotic to 7.207/31.5/32/12.5, HCO: 12 and the UA showed ketones. Was placed on lasix gtt with IVF with closing of gap and DKA resolved. Patient started on diet and transitioned to subcutaneous insulin however suboptimal control of blood glucose levels with SQ lantus and prandial SQ lispro. Patient was started on home insulin pump to see if functioning properly. Blood sugars were well controlled with home insulin pump and patient was discharged. Spoke to patient's outpatient Endocrinology provider and recommended continue insulin pump with current settings; pt scheduled for close outpatient follow up appointment on Thursday, May 2nd. Patient also seen by diabetes educator and social work for financial assistance with getting insulin supplies.      Anion Gap Metabolic Acidosis 2/2 DKA, resolved  HCO3: 12, A on admission. Numbers improved with insulin and IVF.      JUSTIN, likely prerenal, resolved. Likely 2/2 to dehydration and compounding DKA. Cr: 1.4 on admission. Improved with IVF and lasix gtt. Monitored with BMP Q4H.      HLD  Patient was continued on home Crestor.        Physical Exam:  /76 Pulse 72   Temp 98 °F (36.7 °C) (Oral)   Resp 24   Ht 5' 11\" (1.803 m)   Wt 195 lb 8.8 oz (88.7 kg)   SpO2 98%   BMI 27.27 kg/m²   Constitutional: He is oriented to person, place, and time. He appears well-developed and well-nourished. No distress. HENT:   Head: Normocephalic and atraumatic. Eyes: Pupils are equal, round, and reactive to light. EOM are normal.   Neck: Normal range of motion. No JVD present. No tracheal deviation present. Cardiovascular: Normal rate, regular rhythm, normal heart sounds and intact distal pulses. Exam reveals no gallop and no friction rub.   No murmur heard. Pulmonary/Chest: Effort normal and breath sounds normal. No stridor. No respiratory distress. He has no wheezes. He has no rales. He exhibits no tenderness. Abdominal: Soft. Bowel sounds are normal. He exhibits no distension and no mass. There is no tenderness. There is no rebound and no guarding.   Musculoskeletal: Normal range of motion. He exhibits no edema, tenderness or deformity. Lymphadenopathy:     He has no cervical adenopathy. Neurological: He is alert and oriented to person, place, and time. He displays normal reflexes. No cranial nerve deficit or sensory deficit. He exhibits normal muscle tone. Coordination normal.   Skin: Skin is warm and dry. Capillary refill takes less than 2 seconds. No rash noted. He is not diaphoretic. No erythema.       Consults: Endocrinology, Dietitian, Diabetes Educator. Treatments: Insulin gtt, SQ insulin; insulin pump. Disposition: home  Discharged Condition: Stable  Follow Up: Primary Care Physician in one week, Endocrinology on Thursday 5/2/2019. DISCHARGE MEDICATION:     Medication List      CHANGE how you take these medications    rosuvastatin 20 MG tablet  Commonly known as:  CRESTOR  Take 1 tablet by mouth nightly  What changed:  Another medication with the same name was removed. Continue taking this medication, and follow the directions you see here. CONTINUE taking these medications    blood glucose test strips strip  Commonly known as:  [employer] CONTOUR NEXT TEST  TEST BLOOD SUGAR SIX TIMES DAILY     glucagon 1 MG injection  Inject 1 mg into the skin as needed. * HUMALOG 100 UNIT/ML injection vial  Generic drug:  insulin lispro  INJECT UP  UNITS UNDER THE SKIN DAILY VIA INSULIN PUMP     * HUMALOG 100 UNIT/ML injection vial  Generic drug:  insulin lispro         * This list has 2 medication(s) that are the same as other medications prescribed for you. Read the directions carefully, and ask your doctor or other care provider to review them with you.               Activity: activity as tolerated  Diet: diabetic diet  Wound Care: none needed    Time Spent on discharge is more than 30 minutes    Signed:  Jb Arias MD  Internal Medicine, PGY-2  4/30/2019

## 2019-04-30 NOTE — CONSULTS
Nutrition Education    Type and Reason for Visit: Consult, Patient Education    Verbal consult from RN to visit pt w/ questions carb control diet. Pt w/ type 1 DM w/ insulin pump admitted w/ DKA, pt reports possible pump malfunction. Pt states he counts grams of carbohydrates for dosing on pump and hospital counts carb choices. Pt knowledgeable  re: portion sizes and carbohydrate portions but did review 1 carb choice = ~15 grams of carbohydrates. Provided w/ booklet of food lists containing portion sizes and grams of carbohydrate. · Verbally reviewed information with Patient  · Written educational materials provided. · Contact name and number provided. · Refer to Patient Education activity for more details.     Electronically signed by Fox Senior RD, LD on 4/30/19 at 12:16 PM    Contact Number:   Pager: [phone]  Office: [phone]

## 2019-04-30 NOTE — PROGRESS NOTES
Internal Medicine Resident PGY-2  ICU Progress Note    Admit Date: 4/28/2019      Chief Complaint: Abdominal pain, N/V, hyperglycemia. Hospital Course:  Patient is a 48 yo M with a Hx of DMI (s/p insulin pump 2015, 90-95 U daily) p/w one day's duration of fatigue, abd pain, nausea and vomiting and admitted for diabetic ketoacidosis. 04/28/19: Found to be acidotic to 7.207/31.5/32/12.5, dehydrated with Cr 1.4,  on arrival. AG 29, patient was given 2L IVF and started on insulin gtt. UA showed ketones. Corrected Na on admission: 139.      Interval History:   No acute events overnight. Afebrile, HDS, mildly tachycardic. Glucose 362 -> 226 - > 204, HCO3: 16, AG: 15 from 29 this AM.  Insulin gtt at 20.1 ml/hr. K: 4.6 from 5.6. Getting KPO4: 20 mmol. Corrected sodium today: 137. Started D5 1/2 NS at 150 ml/hr. UOP: 650 ml last 2 shifts (>3 / hr).       Denies any abdominal pain, nausea or vomiting. Reports fatigue is improved. Denies any lightheadedness, dizziness, chest pain, palpitations, SOB, dysuria or hematuria. Past Medical History:      Diagnosis Date    Blood glucose labile     Hyperlipidemia     Insulin pump status     Wears V Go insulin delivery system. Removed at [phone] on12/6. It is external      Thyroid nodule     NODULE    Type I (juvenile type) diabetes mellitus without mention of complication, uncontrolled       Past Surgical History:        Procedure Laterality Date    MOUTH SURGERY      OTHER SURGICAL HISTORY Right [id number]    EXCISION OF RIGHT SHOULDER MASS          Social History:    TOBACCO:   reports that he has never smoked. He has never used smokeless tobacco.  ETOH:   reports that he does not drink alcohol.       Family History:       Problem Relation Age of Onset    High Blood Pressure Mother     Diabetes Mother     Heart Disease Father     Diabetes Maternal Grandmother        Allergies:  No Known Allergies    REVIEW OF SYSTEMS:    Constitutional: He is oriented to person, 8120  Gross per 24 hour   Intake 3286.93 ml   Output 1550 ml   Net 1736.93 ml       Physical Exam   Constitutional: He is oriented to person, place, and time. HENT:   Head: Normocephalic and atraumatic. Eyes: Pupils are equal, round, and reactive to light. EOM are normal.   Neck: Normal range of motion. Neck supple. Cardiovascular: Normal rate, regular rhythm, normal heart sounds and intact distal pulses. Pulmonary/Chest: Effort normal and breath sounds normal. No stridor. No respiratory distress. He has no wheezes. He has no rales. Abdominal: Soft. Bowel sounds are normal. He exhibits no distension. There is no tenderness. There is no guarding. Musculoskeletal: Normal range of motion. He exhibits no edema or tenderness. Neurological: He is alert and oriented to person, place, and time. He displays normal reflexes. No cranial nerve deficit. He exhibits normal muscle tone. Coordination normal.   Skin: Skin is warm and dry. LABS:  Recent Labs     04/28/19  2143 04/29/19  0356 04/30/19  0551   WBC 24.1* 20.2* 9.6   HGB 15.0 13.1* 12.9*   HCT 46.1 39.0* 38.0*    201 170                                                                    Recent Labs     04/30/19  0152 04/30/19  0551 04/30/19  0942    137 135*   K 4.7 4.7 4.4    104 102   CO2 19* 21 23   BUN 29* 26* 27*   CREATININE 1.0 0.9 0.8*   GLUCOSE 300* 240* 292*       Assessment & Plan:    Melissa Lewis is a 47 y.o. male with a Hx of DMI (s/p insulin pump 2015, 90-95 U daily) p/w one day's duration of fatigue, abd pain, nausea and vomiting and admitted for diabetic ketoacidosis.      DKA - Possible insulin pump malfunction - kinked line when changed. No recent illnesses or endorsing symptoms. Found to be acidotic to 7.207/31.5/32/12. 5. HCO: 12. UA showed ketones. Corrected Na on admission: 139.   - Transitioned to SQ yesterday. Glucose elevated to 450. Anion gap reopened briefly (15, 14, 14).  Received 20 Lantus and 45 U

## 2019-04-30 NOTE — CARE COORDINATION
Met with pt and wife after received consult for help with insulin for his pump as pt's insurance has lapsed and he will be moving to [place] in the near future. Pt has enough insulin for 15 more days. Pt has follow up appt with Endocrinologist on Thursday May 2, 2019. CM encouraged pt to discuss insulin needs with them to see how they can help him during his lapse in insurance. No further needs. Pt very complimentary of [facility] and the care he has received. Pt to discharge home today with transport by his wife.       Roby Valencia RN, BSN, [id number]  Case Management Department  [phone]

## 2019-04-30 NOTE — PROGRESS NOTES
ICU Progress Note        PGY1    Hospital Day: 3                                                         Code:Full Code  Admit Date: 2019                                 PCP: Freddy Kamara MD      Chief Complaint: Abdominal pain, N/V, hyperglycemia     Subjective:   Patient is a 50 yo M with a Hx of DMI (s/p insulin pump , 90-95 U daily) p/w one day's duration of fatigue, abd pain, nausea and vomiting and admitted for diabetic ketoacidosis.      19: Found to be acidotic to 7.207/31.5/32/12.5, dehydrated with Cr 1.4,  on arrival. AG 29, patient was given 2L IVF and started on insulin gtt. UA showed ketones. Corrected Na on admission: 139.     19: Gap closed overnight. Insulin below 250. Transitioned to SQ insulin. Blood glucose elevated with closed AG.      Interval History: Glucose elevated to 450. Anion gap reopened briefly (15, 14, 14). Received 20 Lantus and 45 U regular insulin. Glucose 214 this AM with a A. Received 10 U premeal. Restarted on home insulin pump. Denies any lightheadedness, dizziness, headache, chest pain, SOB, palpitations, abdominal pain, diarrhea, constipation, dysuria or hematuria.        Medications:     Scheduled Meds:   rosuvastatin  20 mg Oral Nightly    heparin (porcine)  5,000 Units Subcutaneous 3 times per day    insulin lispro  10 Units Subcutaneous TID WC     Continuous Infusions:   dextrose      Insulin Pump - insulin lispro Stopped (19)     PRN Meds:dextrose, potassium chloride, glucose, dextrose, glucagon (rDNA), dextrose    Objective:   Vitals  T-max: Temp (24hrs), Av.2 °F (36.8 °C), Min:97.5 °F (36.4 °C), Max:98.4 °F (36.9 °C)    Patient Vitals for the past 8 hrs:   BP Temp Temp src Pulse Resp SpO2   19 0600 118/70 -- -- 79 24 --   19 0400 117/73 98.4 °F (36.9 °C) Oral 72 20 99 %   19 0300 -- -- -- 81 19 96 %   19 0200 114/74 -- -- 74 19 95 %   04/30/19 0100 -- -- -- -- -- 96 %   04/30/19 0000 109/66 97.5 °F (36.4 °C) Oral 84 16 99 %   04/29/19 2337 -- -- -- 85 18 --   04/29/19 2300 -- -- -- -- -- 98 %       Intake/Output Summary (Last 24 hours) at 4/30/2019 0646  Last data filed at 4/30/2019 0600  Gross per 24 hour   Intake 2986.93 ml   Output 1850 ml   Net 1136.93 ml     Physical Exam   Constitutional: He is oriented to person, place, and time. He appears well-developed and well-nourished. No distress. HENT:   Head: Normocephalic and atraumatic. Eyes: Pupils are equal, round, and reactive to light. EOM are normal.   Neck: Normal range of motion. No JVD present. No tracheal deviation present. Cardiovascular: Normal rate, regular rhythm, normal heart sounds and intact distal pulses. Exam reveals no gallop and no friction rub. No murmur heard. Pulmonary/Chest: Effort normal and breath sounds normal. No stridor. No respiratory distress. He has no wheezes. He has no rales. He exhibits no tenderness. Abdominal: Soft. Bowel sounds are normal. He exhibits no distension and no mass. There is no tenderness. There is no rebound and no guarding. Insulin pump on R abdomen    Musculoskeletal: Normal range of motion. He exhibits no edema, tenderness or deformity. Lymphadenopathy:     He has no cervical adenopathy. Neurological: He is alert and oriented to person, place, and time. He displays normal reflexes. No cranial nerve deficit or sensory deficit. He exhibits normal muscle tone. Coordination normal.   Skin: Skin is warm and dry. Capillary refill takes less than 2 seconds. No rash noted. He is not diaphoretic. No erythema.          LABS    CBC:   Recent Labs     04/28/19  2143 04/29/19  0356 04/30/19  0551   WBC 24.1* 20.2* 9.6   HGB 15.0 13.1* 12.9*   HCT 46.1 39.0* 38.0*    201 170   MCV 90.0 88.0 87.7     Renal:   Recent Labs     04/29/19  1900 04/29/19  2231 04/30/19  0152 04/30/19  0551   * 133* 136 137   K 4.6 4.9 4.7 4.7   CL 101 103 103 104   CO2 18* 16* 19* 21   BUN 30* 33* 29* 26*   CREATININE 1.2 1.2 1.0 0.9   GLUCOSE 426* 406* 300* 240*   CALCIUM 8.5 8.7 8.5 8.5   MG 2.30  --  2.30 2.10   PHOS 1.8* 1.0* 2.9 2.9   ANIONGAP 15 14 14 12     Hepatic:   Recent Labs     19  1038 19  2231   AST 16  --    ALT 19  --    BILITOT 0.5  --    BILIDIR <0.2  --    PROT 5.9*  --    LABALBU 3.4 3.5   ALKPHOS 51  --      Troponin: No results for input(s): TROPONINI in the last 72 hours. BNP: No results for input(s): BNP in the last 72 hours. Lipids:   Recent Labs     19  1038   HDL 52     ABGs:  No results for input(s): PHART, USA6JQP, PO2ART, HQT3JVU, BEART, THGBART, L5QJTAMS, YAU4YSB in the last 72 hours. INR: No results for input(s): INR in the last 72 hours. Lactate:   Recent Labs     19  2123 19  2352   LACTATE 5.27* 2.34*     -----------------------------------------------------------------  Imaging:  XR CHEST PORTABLE   Final Result      No active cardiopulmonary disease. Assessment/Plan:   Marilee Gaona a 47 y. o. male with a Hx of DMI (s/p insulin pump 2015, 90-95 U daily) p/w one day's duration of fatigue, abd pain, nausea and vomiting and admitted for diabetic ketoacidosis.       DKA - Possible insulin pump malfunction - kinked line when changed. No recent illnesses or endorsing symptoms. Found to be acidotic to 7.207/31.5/32/12. 5. HCO: 12. UA showed ketones. Corrected Na on admission: 139.   - Transitioned to SQ yesterday. Glucose elevated to 450. Anion gap reopened briefly (15, 14, 14). Received 20 Lantus and 45 U regular insulin. Glucose 214 this AM with a A. Received 10 U premeal. Restarted on home insulin pump.    - BMP Q4H   - glucose checks Q1H   - consulted diabetes educator, social work   - Will follow up Endocrinology recs regarding insulin pump.       Anion Gap Metabolic Acidosis 2/2 DKA (Resolving) -- HCO3: 21, A this AM.   - improved with insulin and IVF   - BMP Q4H      JUSTIN (Resolved)   - Likely 2/2 to dehydration and compounding DKA, Cr: 1.4 on admission, Cr: 0.9 today   - BMP Q4H      HLD  - cont Crestor    FEN: DIET CARB CONTROL;  VTE Prophylaxis: SQ heparin  Code Status: Full Code  Disposition: ICU; transfer to General Medicine Floor vs possible discharge if glucose stable on insulin pump.       -----------------------------  JEANNETTE Beauchamp acting as scribe   4/30/2019  6:46 AM

## 2019-04-30 NOTE — FLOWSHEET NOTE
Alert & oriented x4 VSS  & anion gap 15 reported to Dr Alex Erps will give 10 units regular insulin IVP will hold off on restarting insulin pump humalog insulin SS d/c'ed will continue to check BS hourly.

## 2019-05-04 LAB
BLOOD CULTURE, ROUTINE: NORMAL
CULTURE, BLOOD 2: NORMAL